# Patient Record
Sex: FEMALE | Race: WHITE | Employment: PART TIME | URBAN - NONMETROPOLITAN AREA
[De-identification: names, ages, dates, MRNs, and addresses within clinical notes are randomized per-mention and may not be internally consistent; named-entity substitution may affect disease eponyms.]

---

## 2017-01-26 LAB
CHOLESTEROL, TOTAL: 190 MG/DL
CHOLESTEROL/HDL RATIO: 2
HDLC SERPL-MCNC: 97 MG/DL (ref 35–70)
LDL CHOLESTEROL CALCULATED: 82.8 MG/DL (ref 0–160)
TRIGL SERPL-MCNC: 51 MG/DL
VLDLC SERPL CALC-MCNC: 10 MG/DL

## 2017-05-01 RX ORDER — MOMETASONE FUROATE 50 UG/1
2 SPRAY, METERED NASAL DAILY
Qty: 1 INHALER | Refills: 2 | Status: SHIPPED | OUTPATIENT
Start: 2017-05-01 | End: 2018-09-17 | Stop reason: SDUPTHER

## 2017-05-01 RX ORDER — MOMETASONE FUROATE 50 UG/1
2 SPRAY, METERED NASAL DAILY
COMMUNITY
End: 2017-05-01 | Stop reason: SDUPTHER

## 2017-05-01 RX ORDER — DOCUSATE SODIUM 100 MG/1
100 CAPSULE, LIQUID FILLED ORAL 2 TIMES DAILY
COMMUNITY
End: 2020-09-04

## 2017-05-01 RX ORDER — POLYETHYLENE GLYCOL 3350 17 G/17G
17 POWDER, FOR SOLUTION ORAL DAILY
COMMUNITY
End: 2017-09-03 | Stop reason: ALTCHOICE

## 2017-05-01 RX ORDER — OMEPRAZOLE 40 MG/1
40 CAPSULE, DELAYED RELEASE ORAL DAILY
COMMUNITY
End: 2022-08-29 | Stop reason: ALTCHOICE

## 2017-05-18 ENCOUNTER — TELEPHONE (OUTPATIENT)
Dept: FAMILY MEDICINE CLINIC | Age: 57
End: 2017-05-18

## 2017-05-22 RX ORDER — MELOXICAM 7.5 MG/1
7.5 TABLET ORAL DAILY
Qty: 30 TABLET | Refills: 1 | Status: SHIPPED | OUTPATIENT
Start: 2017-05-22 | End: 2017-07-19 | Stop reason: SDUPTHER

## 2017-06-06 ENCOUNTER — TELEPHONE (OUTPATIENT)
Dept: FAMILY MEDICINE CLINIC | Age: 57
End: 2017-06-06

## 2017-06-06 DIAGNOSIS — Z12.31 ENCOUNTER FOR SCREENING MAMMOGRAM FOR BREAST CANCER: Primary | ICD-10-CM

## 2017-06-08 DIAGNOSIS — Z12.31 ENCOUNTER FOR SCREENING MAMMOGRAM FOR BREAST CANCER: ICD-10-CM

## 2017-07-20 RX ORDER — MELOXICAM 7.5 MG/1
7.5 TABLET ORAL DAILY
Qty: 30 TABLET | Refills: 1 | Status: SHIPPED | OUTPATIENT
Start: 2017-07-20 | End: 2017-09-28 | Stop reason: SDUPTHER

## 2017-09-03 ENCOUNTER — OFFICE VISIT (OUTPATIENT)
Dept: PRIMARY CARE CLINIC | Age: 57
End: 2017-09-03
Payer: COMMERCIAL

## 2017-09-03 VITALS
OXYGEN SATURATION: 98 % | TEMPERATURE: 98.9 F | RESPIRATION RATE: 16 BRPM | HEIGHT: 64 IN | BODY MASS INDEX: 23.73 KG/M2 | DIASTOLIC BLOOD PRESSURE: 80 MMHG | SYSTOLIC BLOOD PRESSURE: 120 MMHG | WEIGHT: 139 LBS | HEART RATE: 72 BPM

## 2017-09-03 DIAGNOSIS — J06.9 UPPER RESPIRATORY TRACT INFECTION, UNSPECIFIED TYPE: Primary | ICD-10-CM

## 2017-09-03 DIAGNOSIS — J02.9 SORE THROAT: ICD-10-CM

## 2017-09-03 LAB — S PYO AG THROAT QL: NORMAL

## 2017-09-03 PROCEDURE — 87880 STREP A ASSAY W/OPTIC: CPT | Performed by: FAMILY MEDICINE

## 2017-09-03 PROCEDURE — 99213 OFFICE O/P EST LOW 20 MIN: CPT | Performed by: FAMILY MEDICINE

## 2017-09-03 RX ORDER — AMOXICILLIN AND CLAVULANATE POTASSIUM 875; 125 MG/1; MG/1
1 TABLET, FILM COATED ORAL 2 TIMES DAILY
Qty: 20 TABLET | Refills: 0 | Status: SHIPPED | OUTPATIENT
Start: 2017-09-03 | End: 2017-09-13

## 2017-09-03 RX ORDER — DEXTROMETHORPHAN HYDROBROMIDE AND PROMETHAZINE HYDROCHLORIDE 15; 6.25 MG/5ML; MG/5ML
5 SYRUP ORAL NIGHTLY PRN
Qty: 75 ML | Refills: 0 | Status: SHIPPED | OUTPATIENT
Start: 2017-09-03 | End: 2017-12-12 | Stop reason: ALTCHOICE

## 2017-09-03 ASSESSMENT — ENCOUNTER SYMPTOMS
WHEEZING: 0
RHINORRHEA: 0
COUGH: 1
SHORTNESS OF BREATH: 0
VOMITING: 0
NAUSEA: 0
DIARRHEA: 1
SINUS PRESSURE: 0
SORE THROAT: 1

## 2017-09-18 ENCOUNTER — TELEPHONE (OUTPATIENT)
Dept: FAMILY MEDICINE CLINIC | Age: 57
End: 2017-09-18

## 2017-09-18 RX ORDER — MELOXICAM 7.5 MG/1
TABLET ORAL
Qty: 30 TABLET | Refills: 1 | OUTPATIENT
Start: 2017-09-18

## 2017-09-28 RX ORDER — MELOXICAM 7.5 MG/1
7.5 TABLET ORAL DAILY
Qty: 30 TABLET | Refills: 1 | Status: SHIPPED | OUTPATIENT
Start: 2017-09-28 | End: 2017-11-19 | Stop reason: SDUPTHER

## 2017-10-03 VITALS
BODY MASS INDEX: 24.92 KG/M2 | SYSTOLIC BLOOD PRESSURE: 104 MMHG | WEIGHT: 146 LBS | HEIGHT: 64 IN | DIASTOLIC BLOOD PRESSURE: 72 MMHG | HEART RATE: 72 BPM

## 2017-10-03 DIAGNOSIS — R42 VERTIGO: ICD-10-CM

## 2017-10-03 DIAGNOSIS — L98.9 SKIN LESION: ICD-10-CM

## 2017-10-03 DIAGNOSIS — R20.0 NUMBNESS AND TINGLING IN BOTH HANDS: ICD-10-CM

## 2017-10-03 DIAGNOSIS — R20.2 NUMBNESS AND TINGLING IN BOTH HANDS: ICD-10-CM

## 2017-10-03 PROBLEM — K59.00 CONSTIPATION: Status: ACTIVE | Noted: 2017-10-03

## 2017-10-03 RX ORDER — LORATADINE 10 MG/1
10 TABLET ORAL DAILY
COMMUNITY
End: 2019-04-24 | Stop reason: ALTCHOICE

## 2017-10-03 RX ORDER — NAPROXEN SODIUM 220 MG
220 TABLET ORAL 2 TIMES DAILY WITH MEALS
COMMUNITY
End: 2018-11-14 | Stop reason: ALTCHOICE

## 2017-10-03 RX ORDER — POLYETHYLENE GLYCOL 3350 17 G/17G
17 POWDER, FOR SOLUTION ORAL DAILY
COMMUNITY
End: 2017-10-04 | Stop reason: ALTCHOICE

## 2017-10-04 ENCOUNTER — OFFICE VISIT (OUTPATIENT)
Dept: FAMILY MEDICINE CLINIC | Age: 57
End: 2017-10-04
Payer: COMMERCIAL

## 2017-10-04 VITALS
SYSTOLIC BLOOD PRESSURE: 112 MMHG | WEIGHT: 142.9 LBS | HEIGHT: 64 IN | RESPIRATION RATE: 12 BRPM | BODY MASS INDEX: 24.4 KG/M2 | HEART RATE: 76 BPM | DIASTOLIC BLOOD PRESSURE: 68 MMHG

## 2017-10-04 DIAGNOSIS — M19.039 ARTHRITIS PAIN OF WRIST: Primary | ICD-10-CM

## 2017-10-04 PROCEDURE — 99213 OFFICE O/P EST LOW 20 MIN: CPT | Performed by: NURSE PRACTITIONER

## 2017-10-04 ASSESSMENT — PATIENT HEALTH QUESTIONNAIRE - PHQ9
SUM OF ALL RESPONSES TO PHQ QUESTIONS 1-9: 0
2. FEELING DOWN, DEPRESSED OR HOPELESS: 0
1. LITTLE INTEREST OR PLEASURE IN DOING THINGS: 0
SUM OF ALL RESPONSES TO PHQ9 QUESTIONS 1 & 2: 0

## 2017-10-04 NOTE — MR AVS SNAPSHOT
After Visit Summary             Dossie Range   10/4/2017 3:15 PM   Office Visit    Description:  Female : 1960   Provider:  Ranjana Rockwell CNP   Department:  [facility]              Your Follow-Up and Future Appointments         Below is a list of your follow-up and future appointments. This may not be a complete list as you may have made appointments directly with providers that we are not aware of or your providers may have made some for you. Please call your providers to confirm appointments. It is important to keep your appointments. Please bring your current insurance card, photo ID, co-pay, and all medication bottles to your appointment. If self-pay, payment is expected at the time of service. Information from Your Visit        Department     Name Address Phone Fax    [email or web address] [facility] [phone]      Vital Signs     Blood Pressure Pulse Respirations Height Weight Body Mass Index    112/68 (Site: Left Arm, Position: Sitting, Cuff Size: Large Adult) 76 12 5' 4.17\" (1.63 m) 142 lb 14.4 oz (64.8 kg) 24.4 kg/m2    Smoking Status                   Never Smoker              Today's Medication Changes          These changes are accurate as of: 10/4/17  4:11 PM.  If you have any questions, ask your nurse or doctor.                STOP taking these medications           polyethylene glycol powder   Commonly known as:  GLYCOLAX   Stopped by:  Ranjana Rockwell CNP               Your Current Medications Are              loratadine (CLARITIN) 10 MG tablet Take 10 mg by mouth daily    naproxen sodium (ALEVE) 220 MG tablet Take 220 mg by mouth 2 times daily (with meals)    Multiple Vitamins-Minerals (BEROCCA PO) Take by mouth daily    meloxicam (MOBIC) 7.5 MG tablet Take 1 tablet by mouth daily    omeprazole (PRILOSEC) 40 MG delayed release capsule Take 40 mg by mouth daily

## 2017-10-14 NOTE — PROGRESS NOTES
[address] [address] [address]  Dept: [phone]  Dept Fax: [phone]    Chief Complaint   Patient presents with    Finger Pain     left thumb    Wrist Pain     right wrist, bump on right wrist \" shooting pain when I do certain things and some days it fine\"    Other     meloxicam improves pain     Subjective:      Barnett Nyhan is an 65 y.o. female who presents for follow up evaluation of bilateral wrist/hand pain. Onset was gradual, starting about several months ago. The pain is moderate at times. There is associated numbness, tingling, it seems worse at night with sleeping position of wrist. States she bends wrist under her chin while sleeping. There is no history of injury. Treatment to date: meloxicam.    Past Medical History:   Diagnosis Date    Allergic rhinitis      Past Surgical History:   Procedure Laterality Date    BACK SURGERY  2017    ablation     SECTION      CHOLECYSTECTOMY      COLONOSCOPY  2011    COLONOSCOPY  2014    mild diverticulosis Dr Dino Gilman  2012    TUBAL LIGATION      UPPER GASTROINTESTINAL ENDOSCOPY  2011     No family history on file.   Social History     Social History    Marital status:      Spouse name: N/A    Number of children: N/A    Years of education: N/A     Social History Main Topics    Smoking status: Never Smoker    Smokeless tobacco: Never Used    Alcohol use No    Drug use: No    Sexual activity: Not on file     Other Topics Concern    Not on file     Social History Narrative    No narrative on file     Current Outpatient Prescriptions   Medication Sig Dispense Refill    loratadine (CLARITIN) 10 MG tablet Take 10 mg by mouth daily      naproxen sodium (ALEVE) 220 MG tablet Take 220 mg by mouth 2 times daily (with meals)      Multiple Vitamins-Minerals (BEROCCA PO) Take by mouth daily      meloxicam (MOBIC) 7.5 MG tablet Take 1 tablet by mouth daily 30 tablet 1    omeprazole (PRILOSEC) 40 MG delayed release capsule Take 40 mg by mouth daily      docusate sodium (COLACE) 100 MG capsule Take 100 mg by mouth 2 times daily      Probiotic Product (PROBIOTIC DAILY PO) Take 1 tablet by mouth      hyoscyamine (HYOMAX-SL) 125 MCG sublingual tablet Place 125 mcg under the tongue every 4 hours as needed for Cramping      mometasone (NASONEX) 50 MCG/ACT nasal spray 2 sprays by Nasal route daily 1 Inhaler 2    diclofenac 1.5 % SOLN   0    promethazine-dextromethorphan (PROMETHAZINE-DM) 6.25-15 MG/5ML syrup Take 5 mLs by mouth nightly as needed for Cough 75 mL 0     No current facility-administered medications for this visit. Allergies   Allergen Reactions    Sulfa Antibiotics      Cream gives a rash     Review of Systems  Constitutional: negative for chills, fatigue and fevers  Respiratory: negative for cough, shortness of breath and wheezing  Cardiovascular: negative for chest pain, lower extremity edema and palpitations  Musculoskeletal:positive for bilateral wrist/hand pain, negative for muscle weakness  Neurological: negative for dizziness, headaches and weakness     Objective:      /68 (Site: Left Arm, Position: Sitting, Cuff Size: Large Adult)   Pulse 76   Resp 12   Ht 5' 4.17\" (1.63 m)   Wt 142 lb 14.4 oz (64.8 kg)   BMI 24.40 kg/m²      Physical Exam   Constitutional: She is well-developed, well-nourished, and in no distress. HENT:   Head: Normocephalic. Cardiovascular: Normal rate, regular rhythm and normal heart sounds. Pulmonary/Chest: Effort normal and breath sounds normal. No respiratory distress. She has no wheezes. Neurological: She is alert.      Right wrist:  normal exam, no swelling, tenderness, instability; ligaments intact, full ROM both hands, wrists, and finger joints   Left wrist:  normal exam, no swelling, tenderness, instability; ligaments intact, full ROM both hands,

## 2017-11-20 NOTE — TELEPHONE ENCOUNTER
[facility] is calling to request a refill on the following medication(s):  Requested Prescriptions     Pending Prescriptions Disp Refills    meloxicam (MOBIC) 7.5 MG tablet [Pharmacy Med Name: MELOXICAM 7.5 MG TABLET] 30 tablet 1     Sig: take 1 tablet by mouth once daily       Last Visit Date (If Applicable):  99/1/9753    Next Visit Date:    Visit date not found

## 2017-11-21 RX ORDER — MELOXICAM 7.5 MG/1
TABLET ORAL
Qty: 30 TABLET | Refills: 1 | Status: SHIPPED | OUTPATIENT
Start: 2017-11-21 | End: 2017-12-12 | Stop reason: SDUPTHER

## 2017-12-12 ENCOUNTER — OFFICE VISIT (OUTPATIENT)
Dept: FAMILY MEDICINE CLINIC | Age: 57
End: 2017-12-12
Payer: COMMERCIAL

## 2017-12-12 VITALS
SYSTOLIC BLOOD PRESSURE: 120 MMHG | BODY MASS INDEX: 24.07 KG/M2 | OXYGEN SATURATION: 98 % | HEART RATE: 70 BPM | RESPIRATION RATE: 10 BRPM | DIASTOLIC BLOOD PRESSURE: 76 MMHG | WEIGHT: 141 LBS

## 2017-12-12 DIAGNOSIS — S46.912A SHOULDER STRAIN, LEFT, INITIAL ENCOUNTER: Primary | ICD-10-CM

## 2017-12-12 PROCEDURE — 99213 OFFICE O/P EST LOW 20 MIN: CPT | Performed by: NURSE PRACTITIONER

## 2017-12-12 RX ORDER — MELOXICAM 15 MG/1
TABLET ORAL
Qty: 30 TABLET | Refills: 1 | Status: SHIPPED | OUTPATIENT
Start: 2017-12-12 | End: 2018-02-12 | Stop reason: SDUPTHER

## 2017-12-12 NOTE — PROGRESS NOTES
[address] [address] [address]  Dept: [phone]  Dept Fax: [phone]      Sam Ryan is a 65 y.o. female who presents today for her medical conditions/complaints as noted below. Chief Complaint   Patient presents with    Arm Pain     left arm pain has been  going on for the past few weeks. Patient taking aleve and taking mobic \" maybe arthritis. Does not bother me at night\" patient points to bicep area       HPI:     Arm Pain    The incident occurred more than 1 week ago. There was no injury mechanism. The pain is present in the left shoulder (and upper arm). Quality: throbbing. The pain does not radiate. The pain is at a severity of 4/10. The pain is mild. The pain has been intermittent since the incident. Pertinent negatives include no chest pain, numbness or tingling. She has tried ice, NSAIDs and rest for the symptoms. The treatment provided moderate relief. Current Outpatient Prescriptions   Medication Sig Dispense Refill    meloxicam (MOBIC) 15 MG tablet take 1 tablet by mouth once daily 30 tablet 1    loratadine (CLARITIN) 10 MG tablet Take 10 mg by mouth daily      naproxen sodium (ALEVE) 220 MG tablet Take 220 mg by mouth 2 times daily (with meals)      Multiple Vitamins-Minerals (BEROCCA PO) Take by mouth daily      omeprazole (PRILOSEC) 40 MG delayed release capsule Take 40 mg by mouth daily      docusate sodium (COLACE) 100 MG capsule Take 100 mg by mouth 2 times daily      Probiotic Product (PROBIOTIC DAILY PO) Take 1 tablet by mouth      hyoscyamine (HYOMAX-SL) 125 MCG sublingual tablet Place 125 mcg under the tongue every 4 hours as needed for Cramping      mometasone (NASONEX) 50 MCG/ACT nasal spray 2 sprays by Nasal route daily 1 Inhaler 2    diclofenac 1.5 % SOLN   0     No current facility-administered medications for this visit.       Allergies   Allergen Reactions    Sulfa Antibiotics      Cream gives a rash       Past Medical History:   Diagnosis Date    Allergic rhinitis      Past Surgical History:   Procedure Laterality Date    BACK SURGERY  2017    ablation     SECTION      CHOLECYSTECTOMY      COLONOSCOPY  2011    COLONOSCOPY  2014    mild diverticulosis Dr Madhu Rodrigues  2012    TUBAL LIGATION      UPPER GASTROINTESTINAL ENDOSCOPY  2011     Social History     Social History    Marital status:      Spouse name: N/A    Number of children: N/A    Years of education: N/A     Occupational History    Not on file. Social History Main Topics    Smoking status: Never Smoker    Smokeless tobacco: Never Used    Alcohol use No    Drug use: No    Sexual activity: Not on file     Other Topics Concern    Not on file     Social History Narrative    No narrative on file     No family history on file. Subjective:      Review of Systems   Constitutional: Negative for chills, fatigue and fever. Respiratory: Negative for shortness of breath and wheezing. Cardiovascular: Negative for chest pain. Musculoskeletal:        Intermittent left shoulder/arm pain   Neurological: Negative for dizziness, tingling, weakness, numbness and headaches. Objective:     /76 (Site: Right Arm, Position: Sitting, Cuff Size: Large Adult)   Pulse 70   Resp 10   Wt 141 lb (64 kg)   SpO2 98%   BMI 24.07 kg/m²     Physical Exam   Constitutional: She is oriented to person, place, and time. She appears well-developed and well-nourished. HENT:   Head: Normocephalic. Neck: Neck supple. Cardiovascular: Normal rate, regular rhythm and normal heart sounds. Pulmonary/Chest: Effort normal and breath sounds normal. No respiratory distress. She has no wheezes. Musculoskeletal:        Right shoulder: Normal.        Left shoulder: She exhibits normal range of motion, no tenderness, no spasm and normal strength.    Neurological: She is alert and oriented to person, place, and time. She has normal strength. Assessment:     1. Shoulder strain, left, initial encounter  meloxicam (MOBIC) 15 MG tablet    CANCELED: XR SHOULDER LEFT (MIN 2 VIEWS)     Plan:     Return if symptoms worsen or fail to improve. Orders Placed This Encounter   Procedures    XR SHOULDER LEFT (MIN 2 VIEWS)     Orders Placed This Encounter   Medications    meloxicam (MOBIC) 15 MG tablet     Sig: take 1 tablet by mouth once daily     Dispense:  30 tablet     Refill:  1     Discussed use, benefit, and side effects of prescribed medications. Increased meloxicam to 15 mg. Instructed NOT to take any other NSAIDS with meloxicam.  All patient questions answered. Patient voiced understanding. Follow up as directed. May need physical therapy if not improving.      Electronically signed by Oj Keith CNP on 12/18/2017 at 1:19 PM

## 2017-12-18 ASSESSMENT — ENCOUNTER SYMPTOMS
SHORTNESS OF BREATH: 0
WHEEZING: 0

## 2018-02-12 DIAGNOSIS — S46.912A SHOULDER STRAIN, LEFT, INITIAL ENCOUNTER: ICD-10-CM

## 2018-02-13 RX ORDER — MELOXICAM 15 MG/1
TABLET ORAL
Qty: 30 TABLET | Refills: 1 | Status: SHIPPED | OUTPATIENT
Start: 2018-02-13 | End: 2018-04-17 | Stop reason: SDUPTHER

## 2018-04-17 DIAGNOSIS — S46.912A SHOULDER STRAIN, LEFT, INITIAL ENCOUNTER: ICD-10-CM

## 2018-04-18 RX ORDER — MELOXICAM 15 MG/1
TABLET ORAL
Qty: 30 TABLET | Refills: 5 | Status: SHIPPED | OUTPATIENT
Start: 2018-04-18 | End: 2018-10-22 | Stop reason: SDUPTHER

## 2018-07-20 RX ORDER — HYOSCYAMINE SULFATE 0.125 MG
TABLET,DISINTEGRATING ORAL
Qty: 30 TABLET | Refills: 5 | Status: SHIPPED | OUTPATIENT
Start: 2018-07-20 | End: 2020-06-18 | Stop reason: SDUPTHER

## 2018-07-20 NOTE — TELEPHONE ENCOUNTER
Chinyere Paniagua is calling to request a refill on the following medication(s):  Requested Prescriptions     Pending Prescriptions Disp Refills    hyoscyamine (OSCIMIN) 125 MCG TBDP dispersible tablet [Pharmacy Med Name: HYOSCYAMINE 0.125 MG ODT] 30 tablet 5     Sig: take 1 tablet under the tongue every 4 hours if needed       Last Visit Date (If Applicable):  Visit date not found    Next Visit Date:    Visit date not found

## 2018-08-01 ENCOUNTER — TELEPHONE (OUTPATIENT)
Dept: FAMILY MEDICINE CLINIC | Age: 58
End: 2018-08-01

## 2018-08-02 DIAGNOSIS — Z12.31 SCREENING MAMMOGRAM, ENCOUNTER FOR: Primary | ICD-10-CM

## 2018-09-17 RX ORDER — MOMETASONE FUROATE 50 UG/1
SPRAY, METERED NASAL
Qty: 17 G | Refills: 2 | Status: SHIPPED | OUTPATIENT
Start: 2018-09-17 | End: 2020-09-04

## 2018-10-22 DIAGNOSIS — S46.912A SHOULDER STRAIN, LEFT, INITIAL ENCOUNTER: ICD-10-CM

## 2018-10-22 RX ORDER — MELOXICAM 15 MG/1
TABLET ORAL
Qty: 30 TABLET | Refills: 5 | Status: SHIPPED | OUTPATIENT
Start: 2018-10-22 | End: 2019-04-22 | Stop reason: SDUPTHER

## 2018-11-14 ENCOUNTER — OFFICE VISIT (OUTPATIENT)
Dept: FAMILY MEDICINE CLINIC | Age: 58
End: 2018-11-14
Payer: COMMERCIAL

## 2018-11-14 VITALS
DIASTOLIC BLOOD PRESSURE: 80 MMHG | WEIGHT: 146 LBS | BODY MASS INDEX: 24.93 KG/M2 | SYSTOLIC BLOOD PRESSURE: 110 MMHG | HEART RATE: 72 BPM

## 2018-11-14 DIAGNOSIS — M19.031 ARTHRITIS OF BOTH WRISTS: Primary | ICD-10-CM

## 2018-11-14 DIAGNOSIS — M19.032 ARTHRITIS OF BOTH WRISTS: Primary | ICD-10-CM

## 2018-11-14 PROCEDURE — 99213 OFFICE O/P EST LOW 20 MIN: CPT | Performed by: NURSE PRACTITIONER

## 2018-11-14 ASSESSMENT — PATIENT HEALTH QUESTIONNAIRE - PHQ9
1. LITTLE INTEREST OR PLEASURE IN DOING THINGS: 0
2. FEELING DOWN, DEPRESSED OR HOPELESS: 0
SUM OF ALL RESPONSES TO PHQ QUESTIONS 1-9: 0
SUM OF ALL RESPONSES TO PHQ QUESTIONS 1-9: 0
SUM OF ALL RESPONSES TO PHQ9 QUESTIONS 1 & 2: 0

## 2018-11-14 NOTE — PROGRESS NOTES
Cardiovascular: Normal rate, regular rhythm and normal heart sounds. Pulmonary/Chest: Effort normal and breath sounds normal. No respiratory distress. Musculoskeletal:        Right wrist: She exhibits normal range of motion, no tenderness and no swelling. Left wrist: She exhibits tenderness (left thumb/wrist- radial side) and swelling (left thumb ). She exhibits normal range of motion. Lymphadenopathy:     She has no cervical adenopathy. Neurological: She is alert and oriented to person, place, and time. Assessment:     1. Arthritis of both wrists        Plan:     Orders Placed This Encounter   Procedures   [facility]ational [facility] - [place]     Referral Priority:   Routine     Referral Type:   Eval and Treat     Referral Reason:   Specialty Services Required     Referral Location:   [facility]     Requested Specialty:   Occupational Therapy     Number of Visits Requested:   1       Return if symptoms worsen or fail to improve. Instructed to continue taking Meloxicam with a meal, and Tylenol arthritis as needed for pain. Discussed use, benefit, and side effects of prescribed medications. All patient questions answered. Patient voiced understanding. Health Maintenance reviewed. Instructed to continue current medications, diet and exercise. Patient agreed with treatment plan. Follow up as directed.      Electronically signed by GONZALEZ Kc CNP on 11/24/2018

## 2019-01-12 ENCOUNTER — HOSPITAL ENCOUNTER (OUTPATIENT)
Dept: GENERAL RADIOLOGY | Age: 59
Discharge: HOME | End: 2019-01-14
Payer: COMMERCIAL

## 2019-01-12 ENCOUNTER — OFFICE VISIT (OUTPATIENT)
Dept: PRIMARY CARE CLINIC | Age: 59
End: 2019-01-12
Payer: COMMERCIAL

## 2019-01-12 VITALS
WEIGHT: 147 LBS | HEIGHT: 64 IN | DIASTOLIC BLOOD PRESSURE: 82 MMHG | BODY MASS INDEX: 25.1 KG/M2 | SYSTOLIC BLOOD PRESSURE: 128 MMHG | OXYGEN SATURATION: 97 % | HEART RATE: 84 BPM

## 2019-01-12 DIAGNOSIS — S60.011A CONTUSION OF RIGHT THUMB WITHOUT DAMAGE TO NAIL, INITIAL ENCOUNTER: Primary | ICD-10-CM

## 2019-01-12 DIAGNOSIS — M79.644 PAIN OF RIGHT THUMB: ICD-10-CM

## 2019-01-12 PROCEDURE — 99213 OFFICE O/P EST LOW 20 MIN: CPT | Performed by: FAMILY MEDICINE

## 2019-01-12 PROCEDURE — 73130 X-RAY EXAM OF HAND: CPT

## 2019-04-13 ENCOUNTER — OFFICE VISIT (OUTPATIENT)
Dept: FAMILY MEDICINE CLINIC | Age: 59
End: 2019-04-13
Payer: COMMERCIAL

## 2019-04-13 VITALS
DIASTOLIC BLOOD PRESSURE: 76 MMHG | HEART RATE: 64 BPM | WEIGHT: 142 LBS | SYSTOLIC BLOOD PRESSURE: 110 MMHG | BODY MASS INDEX: 24.37 KG/M2 | TEMPERATURE: 98.1 F

## 2019-04-13 DIAGNOSIS — H65.03 BILATERAL ACUTE SEROUS OTITIS MEDIA, RECURRENCE NOT SPECIFIED: Primary | ICD-10-CM

## 2019-04-13 PROCEDURE — 99213 OFFICE O/P EST LOW 20 MIN: CPT | Performed by: NURSE PRACTITIONER

## 2019-04-13 RX ORDER — PREDNISONE 20 MG/1
40 TABLET ORAL DAILY
Qty: 10 TABLET | Refills: 0 | Status: SHIPPED | OUTPATIENT
Start: 2019-04-13 | End: 2019-04-24 | Stop reason: ALTCHOICE

## 2019-04-13 RX ORDER — FLUTICASONE PROPIONATE 50 MCG
2 SPRAY, SUSPENSION (ML) NASAL DAILY
Qty: 1 BOTTLE | Refills: 0 | Status: SHIPPED | OUTPATIENT
Start: 2019-04-13 | End: 2019-09-17 | Stop reason: SDUPTHER

## 2019-04-13 RX ORDER — PREDNISOLONE ACETATE 10 MG/ML
SUSPENSION/ DROPS OPHTHALMIC
Refills: 0 | COMMUNITY
Start: 2019-04-05 | End: 2019-04-24 | Stop reason: ALTCHOICE

## 2019-04-13 ASSESSMENT — ENCOUNTER SYMPTOMS
COUGH: 0
SORE THROAT: 1
RHINORRHEA: 0

## 2019-04-13 NOTE — PROGRESS NOTES
[address] [address] [address]  [facility], APRN-CNP  [address]  Phone:  [phone]  Fax:  [phone]  Tammy Escobar is a 63 y.o. female who presents today for her medical conditions/complaints as noted below. Tammy Escobar c/o of Otalgia (pt c/o ear pain, bilateral, some throbbing, has been having some sinus issues and has used a phillip pot, says when used phillip pot could feel fluid in ears, has taken ibuprofen for pain)      HPI:     Otalgia    There is pain in both ears. This is a new problem. The current episode started in the past 7 days. The problem has been gradually worsening. Associated symptoms include headaches, hearing loss and a sore throat. Pertinent negatives include no coughing, ear discharge or rhinorrhea. Associated symptoms comments: Sinus pressure, clear drainage for the most part. Treatments tried: sudafed and neti pot, nasonex, afrin. The treatment provided no relief. There is no history of a chronic ear infection. Wt Readings from Last 3 Encounters:   19 142 lb (64.4 kg)   19 147 lb (66.7 kg)   18 146 lb (66.2 kg)       Temp Readings from Last 3 Encounters:   19 98.1 °F (36.7 °C)   17 98.9 °F (37.2 °C)       BP Readings from Last 3 Encounters:   19 110/76   19 128/82   18 110/80       Pulse Readings from Last 3 Encounters:   19 64   19 84   18 72              Past Medical History:   Diagnosis Date    Allergic rhinitis       Past Surgical History:   Procedure Laterality Date    BACK SURGERY  2017    ablation     SECTION      CHOLECYSTECTOMY      COLONOSCOPY  2011    COLONOSCOPY  2014    mild diverticulosis Dr Abhinav Escalona  2012    TUBAL LIGATION      UPPER GASTROINTESTINAL ENDOSCOPY  2011     History reviewed. No pertinent family history.   Social History     Tobacco Use    Smoking status: Never Smoker  Smokeless tobacco: Never Used   Substance Use Topics    Alcohol use: No      Current Outpatient Medications   Medication Sig Dispense Refill    prednisoLONE acetate (PRED FORTE) 1 % ophthalmic suspension INSTILL 1 DROP IN LEFT EYE 4 TIMES A DAY FOR 1 WEEK  0    predniSONE (DELTASONE) 20 MG tablet Take 2 tablets by mouth daily 10 tablet 0    fluticasone (FLONASE) 50 MCG/ACT nasal spray 2 sprays by Each Nare route daily 1 Bottle 0    meloxicam (MOBIC) 15 MG tablet take 1 tablet by mouth once daily with food or milk 30 tablet 5    mometasone (NASONEX) 50 MCG/ACT nasal spray instill 2 sprays into each nostril once daily 17 g 2    hyoscyamine (OSCIMIN) 125 MCG TBDP dispersible tablet take 1 tablet under the tongue every 4 hours if needed 30 tablet 5    diclofenac 1.5 % SOLN   0    loratadine (CLARITIN) 10 MG tablet Take 10 mg by mouth daily      Multiple Vitamins-Minerals (BEROCCA PO) Take by mouth daily      omeprazole (PRILOSEC) 40 MG delayed release capsule Take 40 mg by mouth daily      docusate sodium (COLACE) 100 MG capsule Take 100 mg by mouth 2 times daily      Probiotic Product (PROBIOTIC DAILY PO) Take 1 tablet by mouth      hyoscyamine (HYOMAX-SL) 125 MCG sublingual tablet Place 125 mcg under the tongue every 4 hours as needed for Cramping       No current facility-administered medications for this visit. Allergies   Allergen Reactions    Sulfa Antibiotics      Cream gives a rash       No exam data present    Subjective:      Review of Systems   HENT: Positive for ear pain, hearing loss and sore throat. Negative for ear discharge and rhinorrhea. Respiratory: Negative for cough. Neurological: Positive for headaches. Objective:     /76 (Site: Right Upper Arm, Position: Sitting)   Pulse 64   Temp 98.1 °F (36.7 °C)   Wt 142 lb (64.4 kg)   BMI 24.37 kg/m²     Physical Exam   Constitutional: She is oriented to person, place, and time.  Vital signs are normal. She appears well-developed and well-nourished. No distress. HENT:   Head: Normocephalic. Right Ear: External ear normal. A middle ear effusion is present. Left Ear: External ear normal. A middle ear effusion is present. Nose: Right sinus exhibits no maxillary sinus tenderness and no frontal sinus tenderness. Left sinus exhibits no maxillary sinus tenderness and no frontal sinus tenderness. Mouth/Throat: Uvula is midline, oropharynx is clear and moist and mucous membranes are normal.   Eyes: Conjunctivae and EOM are normal. Right eye exhibits no discharge. Left eye exhibits no discharge. No scleral icterus. Neck: Normal range of motion. Neck supple. Cardiovascular: Normal rate and regular rhythm. Pulmonary/Chest: Effort normal. No respiratory distress. Musculoskeletal: Normal range of motion. Neurological: She is alert and oriented to person, place, and time. Skin: Skin is warm, dry and intact. Capillary refill takes less than 2 seconds. She is not diaphoretic. Psychiatric: She has a normal mood and affect. Her speech is normal and behavior is normal. Judgment and thought content normal. Cognition and memory are normal.   Nursing note and vitals reviewed. Assessment:      Diagnosis Orders   1. Bilateral acute serous otitis media, recurrence not specified  predniSONE (DELTASONE) 20 MG tablet    fluticasone (FLONASE) 50 MCG/ACT nasal spray     No results found for this visit on 04/13/19. No infection noted. Plan:       Take the prednisone with food, preferably first thing in the morning. Flonase nasal spray daily in the AM.  Sudafed 12 hour. 1 tab each morning. Use distilled water for your neti-pot. Follow up with primary care provider in 1 to 2 days. Patient Instructions     Take the prednisone with food, preferably first thing in the morning. Flonase nasal spray daily in the AM.  Sudafed 12 hour. 1 tab each morning. Use distilled water for your neti-pot.   Follow up with primary care provider in 1 to 2 days. Patient Education        Middle Ear Fluid: Care Instructions  Your Care Instructions    Fluid often builds up inside the ear during a cold or allergies. Usually the fluid drains away, but sometimes a small tube in the ear, called the eustachian tube, stays blocked for months. Symptoms of fluid buildup may include:  · Popping, ringing, or a feeling of fullness or pressure in the ear. · Trouble hearing. · Balance problems and dizziness. In most cases, you can treat yourself at home. Follow-up care is a key part of your treatment and safety. Be sure to make and go to all appointments, and call your doctor if you are having problems. It's also a good idea to know your test results and keep a list of the medicines you take. How can you care for yourself at home? · In most cases, the fluid clears up within a few months without treatment. You may need more tests if the fluid does not clear up after 3 months. · If your doctor prescribed antibiotics, take them as directed. Do not stop taking them just because you feel better. You need to take the full course of antibiotics. When should you call for help? Call your doctor now or seek immediate medical care if:    · You have symptoms of infection, such as:  ? Increased pain, swelling, warmth, or redness. ? Pus draining from the area. ? A fever.    Watch closely for changes in your health, and be sure to contact your doctor if:    · You notice changes in hearing.     · You do not get better as expected. Where can you learn more? Go to https://[employer] [email or web address] org and sign in to your Clavister account. Enter W479 in the Socialbakers box to learn more about \"Middle Ear Fluid: Care Instructions. \"     If you do not have an account, please click on the \"Sign Up Now\" link. Current as of: March 27, 2018  Content Version: 11.9  © 3171-0773 [employer].  Care instructions adapted under license by [facility] ([facility]). If you have questions about a medical condition or this instruction, always ask your healthcare professional. [place] [address] any warranty or liability for your use of this information. Patient/Caregiver instructed on use, benefit, and side effects of prescribed medications. All patient/parent/caregiver questions answered. Patient/parent/caregiver voiced understanding. Reviewed health maintenance. Instructed to continue current medications, diet and exercise. Patient agreed with treatment plan. Follow up as directed.            Electronically signed by GONZALEZ Monahan CNP on4/13/2019

## 2019-04-13 NOTE — PATIENT INSTRUCTIONS
Take the prednisone with food, preferably first thing in the morning. Flonase nasal spray daily in the AM.  Sudafed 12 hour. 1 tab each morning. Use distilled water for your neti-pot. Follow up with primary care provider in 1 to 2 days. Patient Education        Middle Ear Fluid: Care Instructions  Your Care Instructions    Fluid often builds up inside the ear during a cold or allergies. Usually the fluid drains away, but sometimes a small tube in the ear, called the eustachian tube, stays blocked for months. Symptoms of fluid buildup may include:  · Popping, ringing, or a feeling of fullness or pressure in the ear. · Trouble hearing. · Balance problems and dizziness. In most cases, you can treat yourself at home. Follow-up care is a key part of your treatment and safety. Be sure to make and go to all appointments, and call your doctor if you are having problems. It's also a good idea to know your test results and keep a list of the medicines you take. How can you care for yourself at home? · In most cases, the fluid clears up within a few months without treatment. You may need more tests if the fluid does not clear up after 3 months. · If your doctor prescribed antibiotics, take them as directed. Do not stop taking them just because you feel better. You need to take the full course of antibiotics. When should you call for help? Call your doctor now or seek immediate medical care if:    · You have symptoms of infection, such as:  ? Increased pain, swelling, warmth, or redness. ? Pus draining from the area. ? A fever.    Watch closely for changes in your health, and be sure to contact your doctor if:    · You notice changes in hearing.     · You do not get better as expected. Where can you learn more? Go to https://MetraTechforeigneb.MyDemocracy. org and sign in to your HackHands account. Enter D638 in the Tapit box to learn more about \"Middle Ear Fluid: Care Instructions. \"     If

## 2019-04-22 DIAGNOSIS — S46.912A SHOULDER STRAIN, LEFT, INITIAL ENCOUNTER: ICD-10-CM

## 2019-04-22 NOTE — TELEPHONE ENCOUNTER
Darrne [facility] is calling to request a refill on the following medication(s):  Requested Prescriptions     Pending Prescriptions Disp Refills    meloxicam (MOBIC) 15 MG tablet [Pharmacy Med Name: MELOXICAM 15 MG TABLET] 30 tablet 5     Sig: take 1 tablet by mouth once daily with food or milk       Last Visit Date (If Applicable):  40/91/2576    Next Visit Date:    Not found

## 2019-04-23 RX ORDER — MELOXICAM 15 MG/1
TABLET ORAL
Qty: 30 TABLET | Refills: 5 | Status: SHIPPED | OUTPATIENT
Start: 2019-04-23 | End: 2019-10-15 | Stop reason: SDUPTHER

## 2019-04-24 ENCOUNTER — OFFICE VISIT (OUTPATIENT)
Dept: FAMILY MEDICINE CLINIC | Age: 59
End: 2019-04-24
Payer: COMMERCIAL

## 2019-04-24 VITALS
BODY MASS INDEX: 24.03 KG/M2 | HEART RATE: 65 BPM | WEIGHT: 140 LBS | OXYGEN SATURATION: 98 % | DIASTOLIC BLOOD PRESSURE: 58 MMHG | SYSTOLIC BLOOD PRESSURE: 100 MMHG

## 2019-04-24 DIAGNOSIS — H65.91 MEE (MIDDLE EAR EFFUSION), RIGHT: Primary | ICD-10-CM

## 2019-04-24 PROCEDURE — 99213 OFFICE O/P EST LOW 20 MIN: CPT | Performed by: NURSE PRACTITIONER

## 2019-04-24 RX ORDER — CETIRIZINE HYDROCHLORIDE 10 MG/1
10 TABLET ORAL DAILY
Qty: 30 TABLET | Refills: 5 | COMMUNITY
Start: 2019-04-24

## 2019-04-24 ASSESSMENT — PATIENT HEALTH QUESTIONNAIRE - PHQ9
SUM OF ALL RESPONSES TO PHQ9 QUESTIONS 1 & 2: 0
SUM OF ALL RESPONSES TO PHQ QUESTIONS 1-9: 0
SUM OF ALL RESPONSES TO PHQ QUESTIONS 1-9: 0
1. LITTLE INTEREST OR PLEASURE IN DOING THINGS: 0
2. FEELING DOWN, DEPRESSED OR HOPELESS: 0

## 2019-04-24 ASSESSMENT — ENCOUNTER SYMPTOMS
SORE THROAT: 0
COUGH: 0
WHEEZING: 0
RHINORRHEA: 0
SHORTNESS OF BREATH: 0
SINUS PRESSURE: 0

## 2019-04-24 NOTE — PROGRESS NOTES
Diagnosis Date    Allergic rhinitis      Past Surgical History:   Procedure Laterality Date    BACK SURGERY  2017    ablation     SECTION      CHOLECYSTECTOMY      COLONOSCOPY  2011    COLONOSCOPY  2014    mild diverticulosis Dr Melisa Jorgensen  2012    TUBAL LIGATION      UPPER GASTROINTESTINAL ENDOSCOPY  2011     Social History     Socioeconomic History    Marital status:      Spouse name: Not on file    Number of children: Not on file    Years of education: Not on file    Highest education level: Not on file   Occupational History    Not on file   Social Needs    Financial resource strain: Not on file    Food insecurity:     Worry: Not on file     Inability: Not on file    Transportation needs:     Medical: Not on file     Non-medical: Not on file   Tobacco Use    Smoking status: Never Smoker    Smokeless tobacco: Never Used   Substance and Sexual Activity    Alcohol use: No    Drug use: No    Sexual activity: Not on file   Lifestyle    Physical activity:     Days per week: Not on file     Minutes per session: Not on file    Stress: Not on file   Relationships    Social connections:     Talks on phone: Not on file     Gets together: Not on file     Attends Synagogue service: Not on file     Active member of club or organization: Not on file     Attends meetings of clubs or organizations: Not on file     Relationship status: Not on file    Intimate partner violence:     Fear of current or ex partner: Not on file     Emotionally abused: Not on file     Physically abused: Not on file     Forced sexual activity: Not on file   Other Topics Concern    Not on file   Social History Narrative    Not on file     History reviewed. No pertinent family history. Subjective:      Review of Systems   Constitutional: Negative for appetite change, chills, fatigue and fever.    HENT: Positive for ear pain (left feels plugged, right is painful). Negative for congestion, ear discharge, postnasal drip, rhinorrhea, sinus pressure and sore throat. Respiratory: Negative for cough, shortness of breath and wheezing. Cardiovascular: Negative for chest pain. Musculoskeletal: Negative for neck pain. Neurological: Negative for headaches. Objective:     BP (!) 100/58 (Site: Left Upper Arm, Position: Sitting)   Pulse 65   Wt 140 lb (63.5 kg)   SpO2 98%   BMI 24.03 kg/m²     Physical Exam   Constitutional: She is oriented to person, place, and time. She appears well-developed and well-nourished. HENT:   Head: Normocephalic. Right Ear: Tympanic membrane is scarred. A middle ear effusion is present. Left Ear: Tympanic membrane and ear canal normal.   Nose: Nose normal.   Mouth/Throat: Oropharynx is clear and moist.   Eyes: Conjunctivae are normal.   Neck: Neck supple. Cardiovascular: Normal rate, regular rhythm and normal heart sounds. Pulmonary/Chest: Effort normal and breath sounds normal. No respiratory distress. She has no wheezes. Lymphadenopathy:     She has no cervical adenopathy. Neurological: She is alert and oriented to person, place, and time. Assessment:      Diagnosis Orders   1. KEVIN (middle ear effusion), right  cetirizine (ZYRTEC ALLERGY) 10 MG tablet       Plan:     Return if symptoms worsen or fail to improve. Orders Placed This Encounter   Medications    cetirizine (ZYRTEC ALLERGY) 10 MG tablet     Sig: Take 1 tablet by mouth daily     Dispense:  30 tablet     Refill:  5      Instructed to start Flonase, discontinue Claritin and start Zyrtec. Discussed use, benefit, and side effects of prescribed medications. Continue tylenol/ibuprofen as needed for fevers/discomfort. Monitor for worsening symptoms, call office with any concerns. All patient questions answered. Patient voiced understanding. Follow up as directed.      Electronically signed by GONZALEZ Fitzgerald CNP on 4/24/2019 at 2:49

## 2019-07-31 DIAGNOSIS — Z12.31 SCREENING MAMMOGRAM, ENCOUNTER FOR: Primary | ICD-10-CM

## 2019-09-16 DIAGNOSIS — H65.03 BILATERAL ACUTE SEROUS OTITIS MEDIA, RECURRENCE NOT SPECIFIED: ICD-10-CM

## 2019-09-17 RX ORDER — FLUTICASONE PROPIONATE 50 MCG
SPRAY, SUSPENSION (ML) NASAL
Qty: 16 G | Refills: 0 | Status: SHIPPED | OUTPATIENT
Start: 2019-09-17 | End: 2020-07-20

## 2019-10-03 ENCOUNTER — OFFICE VISIT (OUTPATIENT)
Dept: FAMILY MEDICINE CLINIC | Age: 59
End: 2019-10-03
Payer: COMMERCIAL

## 2019-10-03 ENCOUNTER — HOSPITAL ENCOUNTER (OUTPATIENT)
Age: 59
Setting detail: SPECIMEN
Discharge: HOME | End: 2019-10-03
Payer: COMMERCIAL

## 2019-10-03 VITALS
DIASTOLIC BLOOD PRESSURE: 68 MMHG | HEART RATE: 70 BPM | BODY MASS INDEX: 23.52 KG/M2 | SYSTOLIC BLOOD PRESSURE: 110 MMHG | OXYGEN SATURATION: 97 % | WEIGHT: 137 LBS

## 2019-10-03 DIAGNOSIS — N30.00 ACUTE CYSTITIS WITHOUT HEMATURIA: ICD-10-CM

## 2019-10-03 DIAGNOSIS — N89.8 VAGINAL ITCHING: Primary | ICD-10-CM

## 2019-10-03 LAB
APPEARANCE FLUID: CLEAR
BILIRUBIN, POC: NEGATIVE
BLOOD URINE, POC: NEGATIVE
CLARITY, POC: CLEAR
COLOR, POC: ABNORMAL
GLUCOSE URINE, POC: NEGATIVE
KETONES, POC: NEGATIVE
LEUKOCYTE EST, POC: ABNORMAL
NITRITE, POC: NEGATIVE
PH, POC: 7
PROTEIN, POC: NEGATIVE
SPECIFIC GRAVITY, POC: 1
UROBILINOGEN, POC: 0.2

## 2019-10-03 PROCEDURE — 87086 URINE CULTURE/COLONY COUNT: CPT

## 2019-10-03 PROCEDURE — 99213 OFFICE O/P EST LOW 20 MIN: CPT | Performed by: NURSE PRACTITIONER

## 2019-10-03 PROCEDURE — 87086 URINE CULTURE/COLONY COUNT: CPT | Performed by: NURSE PRACTITIONER

## 2019-10-03 PROCEDURE — 81002 URINALYSIS NONAUTO W/O SCOPE: CPT | Performed by: NURSE PRACTITIONER

## 2019-10-03 RX ORDER — CIPROFLOXACIN 500 MG/1
500 TABLET, FILM COATED ORAL 2 TIMES DAILY
Qty: 14 TABLET | Refills: 0 | Status: SHIPPED | OUTPATIENT
Start: 2019-10-03 | End: 2019-10-10

## 2019-10-03 RX ORDER — FLUCONAZOLE 150 MG/1
TABLET ORAL
Qty: 2 TABLET | Refills: 0 | Status: SHIPPED | OUTPATIENT
Start: 2019-10-03 | End: 2019-10-06

## 2019-10-03 ASSESSMENT — ENCOUNTER SYMPTOMS
NAUSEA: 0
ABDOMINAL PAIN: 1
DIARRHEA: 1

## 2019-10-05 LAB
CULTURE: NO GROWTH
Lab: NORMAL
SPECIMEN DESCRIPTION: NORMAL

## 2019-10-11 ASSESSMENT — ENCOUNTER SYMPTOMS
VOMITING: 0
WHEEZING: 0
SHORTNESS OF BREATH: 0
BLOOD IN STOOL: 0
CONSTIPATION: 0

## 2019-10-15 DIAGNOSIS — S46.912A SHOULDER STRAIN, LEFT, INITIAL ENCOUNTER: ICD-10-CM

## 2019-10-15 RX ORDER — MELOXICAM 15 MG/1
TABLET ORAL
Qty: 30 TABLET | Refills: 5 | Status: SHIPPED | OUTPATIENT
Start: 2019-10-15 | End: 2020-04-09 | Stop reason: SDUPTHER

## 2019-10-28 ENCOUNTER — HOSPITAL ENCOUNTER (OUTPATIENT)
Age: 59
Setting detail: SPECIMEN
Discharge: HOME | End: 2019-10-28
Payer: COMMERCIAL

## 2019-10-28 ENCOUNTER — OFFICE VISIT (OUTPATIENT)
Dept: FAMILY MEDICINE CLINIC | Age: 59
End: 2019-10-28
Payer: COMMERCIAL

## 2019-10-28 VITALS
BODY MASS INDEX: 23.52 KG/M2 | OXYGEN SATURATION: 98 % | HEART RATE: 70 BPM | DIASTOLIC BLOOD PRESSURE: 78 MMHG | WEIGHT: 137 LBS | SYSTOLIC BLOOD PRESSURE: 122 MMHG

## 2019-10-28 DIAGNOSIS — Z12.4 SCREENING FOR CERVICAL CANCER: ICD-10-CM

## 2019-10-28 DIAGNOSIS — Z01.411 ENCOUNTER FOR GYNECOLOGICAL EXAMINATION WITH ABNORMAL FINDING: Primary | ICD-10-CM

## 2019-10-28 DIAGNOSIS — N89.8 VAGINAL ITCHING: ICD-10-CM

## 2019-10-28 DIAGNOSIS — N76.0 ACUTE VAGINITIS: ICD-10-CM

## 2019-10-28 DIAGNOSIS — N95.2 VAGINAL ATROPHY: ICD-10-CM

## 2019-10-28 LAB
BILIRUBIN, POC: NEGATIVE
BLOOD URINE, POC: NEGATIVE
CLARITY, POC: CLEAR
COLOR, POC: NORMAL
GLUCOSE URINE, POC: NEGATIVE
KETONES, POC: NEGATIVE
LEUKOCYTE EST, POC: NEGATIVE
NITRITE, POC: NEGATIVE
PH, POC: 6
PROTEIN, POC: NEGATIVE
SPECIFIC GRAVITY, POC: 1
UROBILINOGEN, POC: 0.2

## 2019-10-28 PROCEDURE — 99396 PREV VISIT EST AGE 40-64: CPT | Performed by: NURSE PRACTITIONER

## 2019-10-28 PROCEDURE — 87086 URINE CULTURE/COLONY COUNT: CPT | Performed by: NURSE PRACTITIONER

## 2019-10-28 PROCEDURE — 87660 TRICHOMONAS VAGIN DIR PROBE: CPT

## 2019-10-28 PROCEDURE — 87480 CANDIDA DNA DIR PROBE: CPT

## 2019-10-28 PROCEDURE — 87086 URINE CULTURE/COLONY COUNT: CPT

## 2019-10-28 PROCEDURE — G0145 SCR C/V CYTO,THINLAYER,RESCR: HCPCS

## 2019-10-28 PROCEDURE — 87510 GARDNER VAG DNA DIR PROBE: CPT

## 2019-10-28 PROCEDURE — 87480 CANDIDA DNA DIR PROBE: CPT | Performed by: NURSE PRACTITIONER

## 2019-10-28 PROCEDURE — 81002 URINALYSIS NONAUTO W/O SCOPE: CPT | Performed by: NURSE PRACTITIONER

## 2019-10-28 ASSESSMENT — ENCOUNTER SYMPTOMS
SHORTNESS OF BREATH: 0
WHEEZING: 0
ABDOMINAL PAIN: 0
BLOOD IN STOOL: 0
CONSTIPATION: 0
DIARRHEA: 0
COUGH: 0

## 2019-10-29 LAB
DIRECT EXAM: NORMAL
Lab: NORMAL
SPECIMEN DESCRIPTION: NORMAL

## 2019-10-30 LAB
CULTURE: NO GROWTH
Lab: NORMAL
SPECIMEN DESCRIPTION: NORMAL

## 2019-11-01 LAB — CYTOLOGY REPORT: NORMAL

## 2019-11-04 DIAGNOSIS — N95.2 ATROPHIC VAGINITIS: Primary | ICD-10-CM

## 2019-11-04 RX ORDER — ESTRADIOL 0.1 MG/G
1 CREAM VAGINAL DAILY
Qty: 1 TUBE | Refills: 3 | Status: SHIPPED | OUTPATIENT
Start: 2019-11-04 | End: 2020-07-17

## 2020-03-11 ENCOUNTER — TELEPHONE (OUTPATIENT)
Dept: FAMILY MEDICINE CLINIC | Age: 60
End: 2020-03-11

## 2020-03-11 NOTE — TELEPHONE ENCOUNTER
Pt is looking for a cheaper alternative to    estradiol (ESTRACE VAGINAL) 0.1 MG/GM vaginal cream    Pt states \"I still have some but now that ins is no longer covering it I'd like to have something ready when I run out'.

## 2020-04-09 RX ORDER — MELOXICAM 15 MG/1
15 TABLET ORAL DAILY
Qty: 30 TABLET | Refills: 0 | Status: SHIPPED | OUTPATIENT
Start: 2020-04-09 | End: 2020-05-27

## 2020-06-18 RX ORDER — HYOSCYAMINE SULFATE 0.125 MG
TABLET,DISINTEGRATING ORAL
Qty: 30 TABLET | Refills: 5 | Status: SHIPPED | OUTPATIENT
Start: 2020-06-18 | End: 2020-09-04 | Stop reason: SDUPTHER

## 2020-07-17 RX ORDER — ESTRADIOL 0.1 MG/G
CREAM VAGINAL
Qty: 42.5 G | Refills: 1 | Status: SHIPPED | OUTPATIENT
Start: 2020-07-17 | End: 2021-02-17

## 2020-09-02 NOTE — TELEPHONE ENCOUNTER
Charley Dorado is requesting a refill on the following medication(s):  Requested Prescriptions     Pending Prescriptions Disp Refills    meloxicam (MOBIC) 15 MG tablet [Pharmacy Med Name: MELOXICAM 15 MG TABLET] 30 tablet 2     Sig: take 1 tablet by mouth once daily       Last Visit Date (If Applicable):  28/62/5820    Next Visit Date:    Visit date not found

## 2020-09-03 ENCOUNTER — TELEPHONE (OUTPATIENT)
Dept: FAMILY MEDICINE CLINIC | Age: 60
End: 2020-09-03

## 2020-09-03 RX ORDER — MELOXICAM 15 MG/1
TABLET ORAL
Qty: 30 TABLET | Refills: 2 | Status: SHIPPED | OUTPATIENT
Start: 2020-09-03 | End: 2020-11-24

## 2020-09-04 ENCOUNTER — OFFICE VISIT (OUTPATIENT)
Dept: FAMILY MEDICINE CLINIC | Age: 60
End: 2020-09-04
Payer: COMMERCIAL

## 2020-09-04 VITALS
DIASTOLIC BLOOD PRESSURE: 80 MMHG | SYSTOLIC BLOOD PRESSURE: 110 MMHG | HEART RATE: 98 BPM | WEIGHT: 136.1 LBS | RESPIRATION RATE: 16 BRPM | HEIGHT: 63 IN | BODY MASS INDEX: 24.11 KG/M2 | OXYGEN SATURATION: 98 %

## 2020-09-04 PROCEDURE — 96372 THER/PROPH/DIAG INJ SC/IM: CPT | Performed by: INTERNAL MEDICINE

## 2020-09-04 PROCEDURE — 99214 OFFICE O/P EST MOD 30 MIN: CPT | Performed by: INTERNAL MEDICINE

## 2020-09-04 RX ORDER — PREDNISONE 20 MG/1
40 TABLET ORAL DAILY
Qty: 10 TABLET | Refills: 0 | Status: SHIPPED | OUTPATIENT
Start: 2020-09-04 | End: 2020-09-09

## 2020-09-04 RX ORDER — TRIAMCINOLONE ACETONIDE 40 MG/ML
40 INJECTION, SUSPENSION INTRA-ARTICULAR; INTRAMUSCULAR ONCE
Status: COMPLETED | OUTPATIENT
Start: 2020-09-04 | End: 2020-09-04

## 2020-09-04 RX ORDER — HYDROCODONE BITARTRATE AND ACETAMINOPHEN 10; 325 MG/1; MG/1
1 TABLET ORAL EVERY 8 HOURS PRN
Qty: 30 TABLET | Refills: 0 | Status: SHIPPED | OUTPATIENT
Start: 2020-09-04 | End: 2020-09-09

## 2020-09-04 RX ORDER — TRIAMCINOLONE ACETONIDE 40 MG/ML
40 INJECTION, SUSPENSION INTRA-ARTICULAR; INTRAMUSCULAR ONCE
Qty: 1 ML | Refills: 0 | Status: SHIPPED | OUTPATIENT
Start: 2020-09-04 | End: 2020-09-04

## 2020-09-04 RX ORDER — CYCLOBENZAPRINE HCL 10 MG
10 TABLET ORAL 3 TIMES DAILY PRN
Qty: 30 TABLET | Refills: 0 | Status: SHIPPED | OUTPATIENT
Start: 2020-09-04 | End: 2020-09-14

## 2020-09-04 RX ADMIN — TRIAMCINOLONE ACETONIDE 40 MG: 40 INJECTION, SUSPENSION INTRA-ARTICULAR; INTRAMUSCULAR at 12:17

## 2020-09-04 ASSESSMENT — PATIENT HEALTH QUESTIONNAIRE - PHQ9
SUM OF ALL RESPONSES TO PHQ9 QUESTIONS 1 & 2: 0
SUM OF ALL RESPONSES TO PHQ QUESTIONS 1-9: 0
2. FEELING DOWN, DEPRESSED OR HOPELESS: 0
1. LITTLE INTEREST OR PLEASURE IN DOING THINGS: 0
SUM OF ALL RESPONSES TO PHQ QUESTIONS 1-9: 0

## 2020-09-04 NOTE — PROGRESS NOTES
kenalog     [facility] [address] [facility]  [address]  Dept: [phone]  Dept Fax: [phone]: [phone]     Visit Date:  9/4/2020    Patient:  Tristin Mendoza  YOB: 1960    HPI:   Tristin Mendoza presents today for   Chief Complaint   Patient presents with    Back Pain     Low back pain, lower left side. She noted 4 days ago after lifting grandchild. Hurts wore when sitting, ice helps. She had tried dry needling. Brina Watters HPI 67-year-old female is coming in today with more than 4-day history of worsening low back pain. Patient reports her pain started this past Monday when she was lifting up her grandchild. She tried icing it and got a little better on Tuesday. Went for dry needling for her chronic lower back on Wednesday and felt fine. On Thursday she woke up again being very stiff and the pain got aggravated after she lifted up her grandchild again. She is in a lot of excruciating pain it is hard for her to sit down as it makes it worse it is localized around the right lower lumbar back area radiating up to the front area 8 out of 10, it was so bad at night last night that she felt almost like puking going into the ER. Sharp-like sensation not traveling down her posterior legs or thigh area no numbness no tingling or no anorectal paresthesias or bowels or bladder incontinence. She is also experiencing muscle cramping spell/spasm-like sensations on Tuesday and Wednesday. Chronically she does suffers back pain but that is mostly on her left side. She takes meloxicam for arthritis for her hand pain as well as took some Tylenol for this new pain. Sitting down bending over makes it worse versus stretching her leg standing walking makes it a little better.   Sleeping on ice packs she feels very stiff and usually she exercises but it was really hard for her to exercise on a treadmill because of the excruciating pain. Medications  Prior to Visit Medications    Medication Sig Taking? Authorizing Provider   meloxicam (MOBIC) 15 MG tablet take 1 tablet by mouth once daily Yes GONZALEZ Ivory CNP   fluticasone (FLONASE) 50 MCG/ACT nasal spray INSTILL 2 SPRAYS INTO EACH NOSTRIL ONCE A DAY Yes GONZALEZ Ivory CNP   estradiol (ESTRACE) 0.1 MG/GM vaginal cream insert 1 gram vaginally once daily for 2 weeks Yes GONZALEZ Ivory CNP   cetirizine (ZYRTEC ALLERGY) 10 MG tablet Take 1 tablet by mouth daily Yes GONZALEZ Ivory CNP   diclofenac 1.5 % SOLN  Yes Historical Provider, MD   Multiple Vitamins-Minerals (BEROCCA PO) Take by mouth daily Yes Historical Provider, MD   omeprazole (PRILOSEC) 40 MG delayed release capsule Take 40 mg by mouth daily Yes Historical Provider, MD   Probiotic Product (PROBIOTIC DAILY PO) Take 1 tablet by mouth Yes Historical Provider, MD   hyoscyamine (HYOMAX-SL) 125 MCG sublingual tablet Place 125 mcg under the tongue every 4 hours as needed for Cramping Yes Historical Provider, MD        Allergies:  is allergic to sulfa antibiotics. Past Medical History:   has a past medical history of Allergic rhinitis. Past Surgical History   has a past surgical history that includes  section; Tubal ligation; ovarian cyst removal; Cholecystectomy; Upper gastrointestinal endoscopy (2011); Colonoscopy (2011); Rotator cuff repair (2012); Colonoscopy (2014); and back surgery (2017). Family History  family history is not on file. Social History   reports that she has never smoked. She has never used smokeless tobacco. She reports that she does not drink alcohol or use drugs.     Health Maintenance:    Health Maintenance   Topic Date Due    Hepatitis C screen  1960    HIV screen  1976    Shingles Vaccine (1 of 2) 2011    Flu vaccine (1) 2020    Breast cancer screen  2021    Lipid screen  2022  Cervical cancer screen  10/28/2022    Colon cancer screen colonoscopy  08/07/2024    DTaP/Tdap/Td vaccine (2 - Td) 11/02/2026    Hepatitis A vaccine  Aged Out    Hepatitis B vaccine  Aged Out    Hib vaccine  Aged Out    Meningococcal (ACWY) vaccine  Aged Out    Pneumococcal 0-64 years Vaccine  Aged Out       Subjective:      Review of Systems   Constitutional: Negative for fatigue, fever and unexpected weight change. HENT: Negative for ear pain, postnasal drip, rhinorrhea, sinus pain, sore throat and trouble swallowing. Eyes: Negative for visual disturbance. Respiratory: Negative for cough, chest tightness and shortness of breath. Cardiovascular: Negative for chest pain and leg swelling. Gastrointestinal: Negative for abdominal pain, blood in stool and diarrhea. Endocrine: Negative for polyuria. Genitourinary: Negative for difficulty urinating and flank pain. Musculoskeletal: Positive for arthralgias, back pain and myalgias. Negative for joint swelling. Low back stiffness   Skin: Negative for rash. Allergic/Immunologic: Negative for environmental allergies. Neurological: Negative for weakness, light-headedness, numbness and headaches. Hematological: Negative for adenopathy. Psychiatric/Behavioral: Negative for behavioral problems and suicidal ideas. The patient is not nervous/anxious. Objective:     /80   Pulse 98   Resp 16   Ht 5' 2.75\" (1.594 m)   Wt 136 lb 1.6 oz (61.7 kg)   SpO2 98%   BMI 24.30 kg/m²     Physical Exam  Vitals signs and nursing note reviewed. HENT:      Head: Normocephalic and atraumatic. Cardiovascular:      Rate and Rhythm: Normal rate and regular rhythm. Pulses: Normal pulses. Heart sounds: Normal heart sounds. Pulmonary:      Effort: Pulmonary effort is normal.      Breath sounds: Normal breath sounds. No stridor. Musculoskeletal:         General: Tenderness present.       Comments: Left lower lumbar paraspinal

## 2020-09-08 ASSESSMENT — ENCOUNTER SYMPTOMS
DIARRHEA: 0
TROUBLE SWALLOWING: 0
BLOOD IN STOOL: 0
COUGH: 0
CHEST TIGHTNESS: 0
BACK PAIN: 1
ABDOMINAL PAIN: 0
SORE THROAT: 0
RHINORRHEA: 0
SHORTNESS OF BREATH: 0
SINUS PAIN: 0

## 2020-09-15 ENCOUNTER — HOSPITAL ENCOUNTER (OUTPATIENT)
Dept: MAMMOGRAPHY | Age: 60
Discharge: HOME | End: 2020-09-17
Payer: COMMERCIAL

## 2020-09-15 PROCEDURE — 77063 BREAST TOMOSYNTHESIS BI: CPT

## 2020-09-30 ENCOUNTER — TELEPHONE (OUTPATIENT)
Dept: FAMILY MEDICINE CLINIC | Age: 60
End: 2020-09-30

## 2020-09-30 NOTE — TELEPHONE ENCOUNTER
We were notified that pt needed a PA for the Voltaren gel. I checked with pharmacy and pt did not fill. I spoke with pt and she says that she is better and does not need this medication.

## 2020-10-27 NOTE — PROGRESS NOTES
[address] [address] [address] [facility]  Dept: [phone]  Dept Fax: [phone]    Mary Saenz is a 62 y.o. female who presents today for her medical conditions/complaints as noted below. Mary Saenz c/o of Hand Pain (c/o right hand palm will get a burning pain and very tender to touch and can't apply pressure to palm of hand) and Skin Problem (has what she says is \"black head\" on right breast )      HPI:     Hand Pain    The incident occurred more than 1 week ago. There was no injury mechanism. The pain is present in the right hand. The quality of the pain is described as burning and aching. The pain does not radiate. The pain is moderate. The pain has been intermittent since the incident. Pertinent negatives include no chest pain, muscle weakness, numbness or tingling. She has tried nothing for the symptoms. BP Readings from Last 3 Encounters:   10/28/20 126/84   20 110/80   10/28/19 122/78              ([id number])    Pulse Readings from Last 3 Encounters:   10/28/20 82   20 98   10/28/19 70        Wt Readings from Last 3 Encounters:   10/28/20 135 lb 1.6 oz (61.3 kg)   20 136 lb 1.6 oz (61.7 kg)   10/28/19 137 lb (62.1 kg)       Past Medical History:   Diagnosis Date    Allergic rhinitis       Past Surgical History:   Procedure Laterality Date    BACK SURGERY  2017    ablation     SECTION      CHOLECYSTECTOMY      COLONOSCOPY  2011    COLONOSCOPY  2014    mild diverticulosis Dr Pippa Solano  2012    TUBAL LIGATION      UPPER GASTROINTESTINAL ENDOSCOPY  2011       No family history on file.     Social History     Tobacco Use    Smoking status: Never Smoker    Smokeless tobacco: Never Used   Substance Use Topics    Alcohol use: No      Current Outpatient Medications   Medication Sig Dispense Refill    DULoxetine (CYMBALTA) 30 MG extended release capsule Take 1 capsule by mouth daily 30 capsule 2    diclofenac sodium (VOLTAREN) 1 % GEL Apply 4 g topically 4 times daily 4 Tube 1    meloxicam (MOBIC) 15 MG tablet take 1 tablet by mouth once daily 30 tablet 2    fluticasone (FLONASE) 50 MCG/ACT nasal spray INSTILL 2 SPRAYS INTO EACH NOSTRIL ONCE A DAY 16 g 3    estradiol (ESTRACE) 0.1 MG/GM vaginal cream insert 1 gram vaginally once daily for 2 weeks 42.5 g 1    cetirizine (ZYRTEC ALLERGY) 10 MG tablet Take 1 tablet by mouth daily 30 tablet 5    diclofenac 1.5 % SOLN   0    Multiple Vitamins-Minerals (BEROCCA PO) Take by mouth daily      omeprazole (PRILOSEC) 40 MG delayed release capsule Take 40 mg by mouth daily      Probiotic Product (PROBIOTIC DAILY PO) Take 1 tablet by mouth      hyoscyamine (HYOMAX-SL) 125 MCG sublingual tablet Place 125 mcg under the tongue every 4 hours as needed for Cramping       No current facility-administered medications for this visit. Allergies   Allergen Reactions    Sulfa Antibiotics      Cream gives a rash       Health Maintenance   Topic Date Due    Hepatitis C screen  1960    Shingles Vaccine (1 of 2) 07/02/2011    Flu vaccine (1) 09/01/2020    Lipid screen  01/26/2022    Breast cancer screen  09/15/2022    Cervical cancer screen  10/28/2022    Colon cancer screen colonoscopy  08/07/2024    DTaP/Tdap/Td vaccine (2 - Td) 11/02/2026    Hepatitis A vaccine  Aged Out    Hepatitis B vaccine  Aged Out    Hib vaccine  Aged Out    Meningococcal (ACWY) vaccine  Aged Out    Pneumococcal 0-64 years Vaccine  Aged Out    HIV screen  Discontinued       Subjective:      Review of Systems   Constitutional: Negative for appetite change, chills, fatigue and fever. HENT: Negative. Respiratory: Negative for cough, shortness of breath and wheezing. Cardiovascular: Negative for chest pain. Musculoskeletal: Positive for arthralgias (right hand).    Neurological: Negative for tingling and numbness. Objective:     /84   Pulse 82   Wt 135 lb 1.6 oz (61.3 kg)   SpO2 98%   BMI 24.12 kg/m²     Physical Exam  Constitutional:       Appearance: She is well-developed. HENT:      Head: Normocephalic. Neck:      Musculoskeletal: Neck supple. Cardiovascular:      Rate and Rhythm: Normal rate and regular rhythm. Heart sounds: Normal heart sounds. Pulmonary:      Effort: Pulmonary effort is normal. No respiratory distress. Breath sounds: Normal breath sounds. Musculoskeletal:      Right hand: She exhibits tenderness (palmar surface). She exhibits normal range of motion, normal capillary refill and no swelling. Normal sensation noted. Normal strength noted. Lymphadenopathy:      Cervical: No cervical adenopathy. Neurological:      Mental Status: She is alert and oriented to person, place, and time. PHQ Scores 9/4/2020 4/24/2019 11/14/2018 10/4/2017   PHQ2 Score 0 0 0 0   PHQ9 Score 0 0 0 0     Interpretation of Total Score Depression Severity: 1-4 = Minimal depression, 5-9 = Mild depression, 10-14 = Moderate depression, 15-19 = Moderately severe depression, 20-27 = Severe depression       Lab Results   Component Value Date    LDLCALC 82.8 01/26/2017       Assessment:     1. Arthritis          Plan:       Orders Placed This Encounter   Medications    DULoxetine (CYMBALTA) 30 MG extended release capsule     Sig: Take 1 capsule by mouth daily     Dispense:  30 capsule     Refill:  2      Discussed use, benefit, and side effects of prescribed medications. All patient questions answered. Patient voiced understanding. Health Maintenance reviewed. Instructed to continue current medications, diet and exercise. Patient agreed with treatment plan. Follow up as directed. Return if symptoms worsen or fail to improve.     Electronically signed by GONZALEZ Vazquez CNP on 11/7/2020

## 2020-10-28 ENCOUNTER — OFFICE VISIT (OUTPATIENT)
Dept: FAMILY MEDICINE CLINIC | Age: 60
End: 2020-10-28
Payer: COMMERCIAL

## 2020-10-28 VITALS
WEIGHT: 135.1 LBS | HEART RATE: 82 BPM | SYSTOLIC BLOOD PRESSURE: 126 MMHG | OXYGEN SATURATION: 98 % | BODY MASS INDEX: 24.12 KG/M2 | DIASTOLIC BLOOD PRESSURE: 84 MMHG

## 2020-10-28 PROCEDURE — 99213 OFFICE O/P EST LOW 20 MIN: CPT | Performed by: NURSE PRACTITIONER

## 2020-10-28 RX ORDER — DULOXETIN HYDROCHLORIDE 30 MG/1
30 CAPSULE, DELAYED RELEASE ORAL DAILY
Qty: 30 CAPSULE | Refills: 2 | Status: SHIPPED | OUTPATIENT
Start: 2020-10-28 | End: 2021-01-14

## 2020-11-07 ASSESSMENT — ENCOUNTER SYMPTOMS
WHEEZING: 0
COUGH: 0
SHORTNESS OF BREATH: 0

## 2020-11-24 RX ORDER — MELOXICAM 15 MG/1
TABLET ORAL
Qty: 30 TABLET | Refills: 2 | Status: SHIPPED | OUTPATIENT
Start: 2020-11-24 | End: 2021-03-02

## 2020-11-24 NOTE — TELEPHONE ENCOUNTER
Lela Higginbotham is requesting a refill on the following medication(s):  Requested Prescriptions     Pending Prescriptions Disp Refills    meloxicam (MOBIC) 15 MG tablet [Pharmacy Med Name: MELOXICAM 15 MG TABLET] 30 tablet 2     Sig: take 1 tablet by mouth once daily       Last Visit Date (If Applicable):  59/52/2932    Next Visit Date:    Visit date not found

## 2021-01-13 ENCOUNTER — HOSPITAL ENCOUNTER (OUTPATIENT)
Age: 61
Setting detail: SPECIMEN
Discharge: HOME | End: 2021-01-13
Payer: COMMERCIAL

## 2021-01-13 DIAGNOSIS — Z86.19 HISTORY OF VIRAL ILLNESS: ICD-10-CM

## 2021-01-13 DIAGNOSIS — Z86.19 HISTORY OF VIRAL ILLNESS: Primary | ICD-10-CM

## 2021-01-13 LAB — SARS-COV-2 ANTIBODY, TOTAL: NEGATIVE

## 2021-01-13 PROCEDURE — 36415 COLL VENOUS BLD VENIPUNCTURE: CPT

## 2021-01-13 PROCEDURE — 86769 SARS-COV-2 COVID-19 ANTIBODY: CPT

## 2021-01-14 DIAGNOSIS — M19.90 ARTHRITIS: ICD-10-CM

## 2021-01-14 RX ORDER — DULOXETIN HYDROCHLORIDE 30 MG/1
CAPSULE, DELAYED RELEASE ORAL
Qty: 30 CAPSULE | Refills: 2 | Status: SHIPPED | OUTPATIENT
Start: 2021-01-14 | End: 2021-04-14

## 2021-01-14 NOTE — TELEPHONE ENCOUNTER
Louis Askew is requesting a refill on the following medication(s):  Requested Prescriptions     Pending Prescriptions Disp Refills    DULoxetine (CYMBALTA) 30 MG extended release capsule [Pharmacy Med Name: DULOXETINE HCL DR 30 MG CAP] 30 capsule 2     Sig: take 1 capsule by mouth once daily       Last Visit Date (If Applicable):  14/76/8308    Next Visit Date:    Visit date not found

## 2021-02-17 ENCOUNTER — OFFICE VISIT (OUTPATIENT)
Dept: FAMILY MEDICINE CLINIC | Age: 61
End: 2021-02-17
Payer: COMMERCIAL

## 2021-02-17 VITALS
SYSTOLIC BLOOD PRESSURE: 100 MMHG | BODY MASS INDEX: 24.64 KG/M2 | WEIGHT: 138 LBS | HEART RATE: 93 BPM | OXYGEN SATURATION: 97 % | DIASTOLIC BLOOD PRESSURE: 70 MMHG

## 2021-02-17 DIAGNOSIS — L30.9 DERMATITIS: Primary | ICD-10-CM

## 2021-02-17 PROCEDURE — 99213 OFFICE O/P EST LOW 20 MIN: CPT | Performed by: FAMILY MEDICINE

## 2021-02-17 RX ORDER — PREDNISONE 20 MG/1
20 TABLET ORAL DAILY
Qty: 5 TABLET | Refills: 0 | Status: SHIPPED | OUTPATIENT
Start: 2021-02-17 | End: 2021-02-22

## 2021-02-17 RX ORDER — ESTRADIOL 0.1 MG/G
CREAM VAGINAL
COMMUNITY
Start: 2020-12-02 | End: 2021-04-22

## 2021-02-17 ASSESSMENT — ENCOUNTER SYMPTOMS
EYE PAIN: 0
COUGH: 0
SORE THROAT: 0
VOMITING: 0
RHINORRHEA: 0
SHORTNESS OF BREATH: 0
DIARRHEA: 0

## 2021-02-17 ASSESSMENT — PATIENT HEALTH QUESTIONNAIRE - PHQ9
SUM OF ALL RESPONSES TO PHQ QUESTIONS 1-9: 0
1. LITTLE INTEREST OR PLEASURE IN DOING THINGS: 0
SUM OF ALL RESPONSES TO PHQ QUESTIONS 1-9: 0

## 2021-02-17 NOTE — PROGRESS NOTES
[facility]  [address]  Dept: [phone]  Dept GRU:[phone]    Shubham Simental is a 62 y.o. female who presents today for her medical conditions/complaints as notedbelow. Shubham Simental is c/o of Rash (on torso )      HPI:     Started on the left torso and now spreading to the other side. NO pain, slightly itchy. Eucerin soothes. Is using a different fabric softner also,  Also a lot of GI issues, did use a senna and has been using 2 per day for the last few days and wonders if this is contributing. No recent ill contacts. No other symptoms. HAs a vacation planned and worried about this. Rash  This is a new problem. The current episode started in the past 7 days (Monday ). The affected locations include the torso. Pertinent negatives include no anorexia, congestion, cough, diarrhea, eye pain, facial edema, fatigue, fever, joint pain, rhinorrhea, shortness of breath, sore throat or vomiting. Past treatments include moisturizer. The treatment provided mild relief. BP Readings from Last 3 Encounters:   21 100/70   10/28/20 126/84   20 110/80          (goal 120/80)    Wt Readings from Last 3 Encounters:   21 138 lb (62.6 kg)   10/28/20 135 lb 1.6 oz (61.3 kg)   20 136 lb 1.6 oz (61.7 kg)        Past Medical History:   Diagnosis Date    Allergic rhinitis       Past Surgical History:   Procedure Laterality Date    BACK SURGERY  2017    ablation     SECTION      CHOLECYSTECTOMY      COLONOSCOPY  2011    COLONOSCOPY  2014    mild diverticulosis Dr Genevieve Webb  2012    TUBAL LIGATION      UPPER GASTROINTESTINAL ENDOSCOPY  2011       History reviewed. No pertinent family history. Social History     Tobacco Use    Smoking status: Never Smoker    Smokeless tobacco: Never Used   Substance Use Topics    Alcohol use:  No Current Outpatient Medications   Medication Sig Dispense Refill    estradiol (ESTRACE) 0.1 MG/GM vaginal cream insert 1 gram vaginally once daily for 2 weeks      predniSONE (DELTASONE) 20 MG tablet Take 1 tablet by mouth daily for 5 days 5 tablet 0    DULoxetine (CYMBALTA) 30 MG extended release capsule take 1 capsule by mouth once daily 30 capsule 2    meloxicam (MOBIC) 15 MG tablet take 1 tablet by mouth once daily 30 tablet 2    diclofenac sodium (VOLTAREN) 1 % GEL Apply 4 g topically 4 times daily 4 Tube 1    fluticasone (FLONASE) 50 MCG/ACT nasal spray INSTILL 2 SPRAYS INTO EACH NOSTRIL ONCE A DAY 16 g 3    cetirizine (ZYRTEC ALLERGY) 10 MG tablet Take 1 tablet by mouth daily 30 tablet 5    diclofenac 1.5 % SOLN   0    Multiple Vitamins-Minerals (BEROCCA PO) Take by mouth daily      omeprazole (PRILOSEC) 40 MG delayed release capsule Take 40 mg by mouth daily      Probiotic Product (PROBIOTIC DAILY PO) Take 1 tablet by mouth      hyoscyamine (HYOMAX-SL) 125 MCG sublingual tablet Place 125 mcg under the tongue every 4 hours as needed for Cramping       No current facility-administered medications for this visit. Allergies   Allergen Reactions    Sulfa Antibiotics      Cream gives a rash       Health Maintenance   Topic Date Due    Hepatitis C screen  1960    Shingles Vaccine (1 of 2) 07/02/2011    Flu vaccine (1) 09/01/2020    Lipid screen  01/26/2022    Breast cancer screen  09/15/2022    Cervical cancer screen  10/28/2022    Colon cancer screen colonoscopy  08/07/2024    DTaP/Tdap/Td vaccine (2 - Td) 11/02/2026    Hepatitis A vaccine  Aged Out    Hepatitis B vaccine  Aged Out    Hib vaccine  Aged Out    Meningococcal (ACWY) vaccine  Aged Out    Pneumococcal 0-64 years Vaccine  Aged Out    HIV screen  Discontinued       Subjective:      Review of Systems   Constitutional: Negative for fatigue and fever. HENT: Negative for congestion, rhinorrhea and sore throat. of this note were completed with a voice-recognition program. Efforts were made to edit the dictation but occasionally words are mis-transcribed.)    Electronically signed by Matty Sherwood MD on 2/21/2021

## 2021-03-02 DIAGNOSIS — S46.912A SHOULDER STRAIN, LEFT, INITIAL ENCOUNTER: ICD-10-CM

## 2021-03-02 RX ORDER — MELOXICAM 15 MG/1
TABLET ORAL
Qty: 30 TABLET | Refills: 2 | Status: SHIPPED | OUTPATIENT
Start: 2021-03-02 | End: 2021-05-25

## 2021-03-02 NOTE — TELEPHONE ENCOUNTER
Douglas Landis is calling to request a refill on the following medication(s):  Requested Prescriptions     Pending Prescriptions Disp Refills    meloxicam (MOBIC) 15 MG tablet [Pharmacy Med Name: MELOXICAM 15 MG TABLET] 30 tablet 2     Sig: take 1 tablet by mouth once daily       Last Visit Date (If Applicable):  5/74/3967    Next Visit Date:    Visit date not found

## 2021-04-14 DIAGNOSIS — M19.90 ARTHRITIS: ICD-10-CM

## 2021-04-14 RX ORDER — DULOXETIN HYDROCHLORIDE 30 MG/1
CAPSULE, DELAYED RELEASE ORAL
Qty: 30 CAPSULE | Refills: 2 | Status: SHIPPED | OUTPATIENT
Start: 2021-04-14 | End: 2021-07-15

## 2021-04-15 DIAGNOSIS — M19.031 ARTHRITIS OF BOTH WRISTS: Primary | ICD-10-CM

## 2021-04-15 DIAGNOSIS — M19.032 ARTHRITIS OF BOTH WRISTS: Primary | ICD-10-CM

## 2021-04-20 DIAGNOSIS — M19.032 ARTHRITIS OF BOTH WRISTS: Primary | ICD-10-CM

## 2021-04-20 DIAGNOSIS — M19.031 ARTHRITIS OF BOTH WRISTS: Primary | ICD-10-CM

## 2021-04-22 DIAGNOSIS — N95.2 ATROPHIC VAGINITIS: Primary | ICD-10-CM

## 2021-04-22 RX ORDER — ESTRADIOL 0.1 MG/G
CREAM VAGINAL
Qty: 42.5 G | Refills: 0 | Status: SHIPPED | OUTPATIENT
Start: 2021-04-22 | End: 2021-12-15

## 2021-04-22 NOTE — TELEPHONE ENCOUNTER
Paramjit Santos is calling to request a refill on the following medication(s):  Requested Prescriptions     Pending Prescriptions Disp Refills    estradiol (ESTRACE) 0.1 MG/GM vaginal cream [Pharmacy Med Name: ESTRADIOL 0.01% CREAM] 42.5 g      Sig: insert 1 gram vaginally once daily for 2 weeks       Last Visit Date (If Applicable):  3[birth year]63/1959    Next Visit Date:    Visit date not found

## 2021-05-25 DIAGNOSIS — S46.912A SHOULDER STRAIN, LEFT, INITIAL ENCOUNTER: ICD-10-CM

## 2021-05-25 RX ORDER — MELOXICAM 15 MG/1
TABLET ORAL
Qty: 30 TABLET | Refills: 2 | Status: SHIPPED | OUTPATIENT
Start: 2021-05-25 | End: 2021-09-07

## 2021-05-25 NOTE — TELEPHONE ENCOUNTER
Caesar Mansfield is calling to request a refill on the following medication(s):  Requested Prescriptions     Pending Prescriptions Disp Refills    meloxicam (MOBIC) 15 MG tablet [Pharmacy Med Name: MELOXICAM 15 MG TABLET] 30 tablet 2     Sig: take 1 tablet by mouth once daily       Last Visit Date (If Applicable):  5/31/8877    Next Visit Date:    Visit date not found

## 2021-07-06 DIAGNOSIS — R10.9 ABDOMINAL CRAMPING: Primary | ICD-10-CM

## 2021-07-06 NOTE — TELEPHONE ENCOUNTER
Fiorella Dunlap is requesting a refill on the following medication(s):  Requested Prescriptions     Pending Prescriptions Disp Refills    hyoscyamine (LEVSIN/SL) 125 MCG sublingual tablet 90 tablet      Sig: Place 1 tablet under the tongue every 4 hours as needed for Cramping       Last Visit Date (If Applicable):  9/27/3291    Next Visit Date:    Visit date not found

## 2021-07-15 DIAGNOSIS — M19.90 ARTHRITIS: ICD-10-CM

## 2021-07-15 RX ORDER — DULOXETIN HYDROCHLORIDE 30 MG/1
CAPSULE, DELAYED RELEASE ORAL
Qty: 30 CAPSULE | Refills: 2 | Status: SHIPPED | OUTPATIENT
Start: 2021-07-15 | End: 2021-10-19

## 2021-07-27 DIAGNOSIS — H65.03 BILATERAL ACUTE SEROUS OTITIS MEDIA, RECURRENCE NOT SPECIFIED: ICD-10-CM

## 2021-07-28 RX ORDER — FLUTICASONE PROPIONATE 50 MCG
SPRAY, SUSPENSION (ML) NASAL
Qty: 16 G | Refills: 3 | Status: SHIPPED | OUTPATIENT
Start: 2021-07-28

## 2021-07-28 NOTE — TELEPHONE ENCOUNTER
Corby Chandra is requesting a refill on the following medication(s):  Requested Prescriptions     Pending Prescriptions Disp Refills    fluticasone (FLONASE) 50 MCG/ACT nasal spray [Pharmacy Med Name: FLUTICASONE PROP 50 MCG SPRAY] 16 g 3     Sig: instill 2 spray into each nostril once daily       Last Visit Date (If Applicable):  67/64/8678    Next Visit Date:    Visit date not found

## 2021-09-10 ENCOUNTER — TELEPHONE (OUTPATIENT)
Dept: MAMMOGRAPHY | Age: 61
End: 2021-09-10

## 2021-09-10 DIAGNOSIS — Z12.31 ENCOUNTER FOR SCREENING MAMMOGRAM FOR BREAST CANCER: Primary | ICD-10-CM

## 2021-09-29 ENCOUNTER — OFFICE VISIT (OUTPATIENT)
Dept: FAMILY MEDICINE CLINIC | Age: 61
End: 2021-09-29
Payer: COMMERCIAL

## 2021-09-29 VITALS
BODY MASS INDEX: 25.71 KG/M2 | DIASTOLIC BLOOD PRESSURE: 80 MMHG | OXYGEN SATURATION: 98 % | SYSTOLIC BLOOD PRESSURE: 132 MMHG | WEIGHT: 144 LBS | HEART RATE: 58 BPM

## 2021-09-29 DIAGNOSIS — M19.032 ARTHRITIS OF BOTH WRISTS: ICD-10-CM

## 2021-09-29 DIAGNOSIS — M19.031 ARTHRITIS OF BOTH WRISTS: ICD-10-CM

## 2021-09-29 DIAGNOSIS — N95.2 ATROPHIC VAGINITIS: ICD-10-CM

## 2021-09-29 DIAGNOSIS — J30.2 SEASONAL ALLERGIC RHINITIS, UNSPECIFIED TRIGGER: ICD-10-CM

## 2021-09-29 DIAGNOSIS — K21.9 GASTROESOPHAGEAL REFLUX DISEASE, UNSPECIFIED WHETHER ESOPHAGITIS PRESENT: ICD-10-CM

## 2021-09-29 DIAGNOSIS — Z13.220 SCREENING FOR HYPERLIPIDEMIA: ICD-10-CM

## 2021-09-29 DIAGNOSIS — Z00.00 ENCOUNTER FOR WELLNESS EXAMINATION IN ADULT: Primary | ICD-10-CM

## 2021-09-29 DIAGNOSIS — M19.012 PRIMARY OSTEOARTHRITIS OF LEFT SHOULDER: ICD-10-CM

## 2021-09-29 PROCEDURE — 99396 PREV VISIT EST AGE 40-64: CPT | Performed by: NURSE PRACTITIONER

## 2021-09-29 SDOH — ECONOMIC STABILITY: FOOD INSECURITY: WITHIN THE PAST 12 MONTHS, THE FOOD YOU BOUGHT JUST DIDN'T LAST AND YOU DIDN'T HAVE MONEY TO GET MORE.: NEVER TRUE

## 2021-09-29 SDOH — ECONOMIC STABILITY: FOOD INSECURITY: WITHIN THE PAST 12 MONTHS, YOU WORRIED THAT YOUR FOOD WOULD RUN OUT BEFORE YOU GOT MONEY TO BUY MORE.: NEVER TRUE

## 2021-09-29 ASSESSMENT — SOCIAL DETERMINANTS OF HEALTH (SDOH): HOW HARD IS IT FOR YOU TO PAY FOR THE VERY BASICS LIKE FOOD, HOUSING, MEDICAL CARE, AND HEATING?: NOT HARD AT ALL

## 2021-09-29 NOTE — PROGRESS NOTES
[address] [address] [address] [facility]  Dept: [phone]  Dept Fax: [phone]    Jeraline Mcardle is a 62 y.o. female who presents today for her medical conditions/complaints as noted below. Jeraline Mcardle c/o of Follow-up (reports was having intense pain under right breast and goes away after 5 min was taking a supplement for weight and has since stopped it and now pain has subsided some but at night is when she feels it most and is controlled with meds and tums)      HPI:   Patient presents to the office for routine wellness visit. She reports having increase pain under the right breast with taking a supplement for weight loss. She discontinued the supplement and pain has resolved. She recently recovered from COVID-19. She walks daily at least 1 mile and works out at the gym 2 times weekly. Gastroesophageal Reflux  She reports no abdominal pain, no belching, no chest pain, no choking, no coughing, no heartburn, no hoarse voice, no nausea, no sore throat or no wheezing. This is a chronic problem. Episode onset: Usually occurs after dinner. The problem occurs occasionally. The heartburn does not wake her from sleep. The heartburn does not limit her activity. The heartburn changes with position. The symptoms are aggravated by certain foods. Pertinent negatives include no anemia, fatigue, melena, muscle weakness, orthopnea or weight loss. Risk factors include NSAIDs. She has tried a PPI for the symptoms. The treatment provided significant relief.      The 10-year ASCVD risk score (Ama Gonzalez, et al., 2013) is: 3.1%    Values used to calculate the score:      Age: 62 years      Sex: Female      Is Non- : No      Diabetic: No      Tobacco smoker: No      Systolic Blood Pressure: 704 mmHg      Is BP treated: No      HDL Cholesterol: 84 mg/dL      Total Cholesterol: 246 mg/dL      BP Readings from Last 3 Encounters: 21 132/80   21 100/70   10/28/20 126/84              ([id number])    Pulse Readings from Last 3 Encounters:   21 58   21 93   10/28/20 82        Wt Readings from Last 3 Encounters:   21 144 lb (65.3 kg)   21 138 lb (62.6 kg)   10/28/20 135 lb 1.6 oz (61.3 kg)       Past Medical History:   Diagnosis Date    Allergic rhinitis       Past Surgical History:   Procedure Laterality Date    BACK SURGERY  2017    ablation     SECTION      CHOLECYSTECTOMY      COLONOSCOPY  2011    COLONOSCOPY  2014    mild diverticulosis Dr [address]  2012    TUBAL LIGATION      UPPER GASTROINTESTINAL ENDOSCOPY  2011       No family history on file.     Social History     Tobacco Use    Smoking status: Never Smoker    Smokeless tobacco: Never Used   Vaping Use    Vaping Use: Never used   Substance Use Topics    Alcohol use: No    Drug use: No      Current Outpatient Medications   Medication Sig Dispense Refill    fluticasone (FLONASE) 50 MCG/ACT nasal spray instill 2 spray into each nostril once daily 16 g 3    DULoxetine (CYMBALTA) 30 MG extended release capsule take 1 capsule by mouth once daily 30 capsule 2    hyoscyamine (LEVSIN/SL) 125 MCG sublingual tablet Place 1 tablet under the tongue every 4 hours as needed for Cramping 90 tablet 3    estradiol (ESTRACE) 0.1 MG/GM vaginal cream insert 1 gram vaginally once daily for 2 weeks 42.5 g 0    diclofenac sodium (VOLTAREN) 1 % GEL Apply 4 g topically 4 times daily 4 Tube 1    cetirizine (ZYRTEC ALLERGY) 10 MG tablet Take 1 tablet by mouth daily 30 tablet 5    diclofenac 1.5 % SOLN   0    Multiple Vitamins-Minerals (BEROCCA PO) Take by mouth daily      omeprazole (PRILOSEC) 40 MG delayed release capsule Take 40 mg by mouth daily      Probiotic Product (PROBIOTIC DAILY PO) Take 1 tablet by mouth      hyoscyamine (HYOMAX-SL) 125 MCG sublingual tablet Place 125 mcg under the tongue every 4 hours as needed for Cramping      meloxicam (MOBIC) 15 MG tablet take 1 tablet by mouth once daily 30 tablet 0     No current facility-administered medications for this visit. Allergies   Allergen Reactions    Sulfa Antibiotics      Cream gives a rash       Health Maintenance   Topic Date Due    Hepatitis C screen  Never done    Shingles Vaccine (1 of 2) Never done    Flu vaccine (1) Never done    Cervical cancer screen  10/28/2022    Breast cancer screen  09/20/2023    Colon cancer screen colonoscopy  08/07/2024    Lipid screen  10/08/2026    DTaP/Tdap/Td vaccine (2 - Td or Tdap) 11/02/2026    COVID-19 Vaccine  Completed    Hepatitis A vaccine  Aged Out    Hepatitis B vaccine  Aged Out    Hib vaccine  Aged Out    Meningococcal (ACWY) vaccine  Aged Out    Pneumococcal 0-64 years Vaccine  Aged Out    HIV screen  Discontinued       Subjective:      Review of Systems   Constitutional: Negative for appetite change, chills, fatigue, fever and weight loss. HENT: Negative. Negative for hoarse voice and sore throat. Eyes: Negative. Respiratory: Negative for cough, choking, shortness of breath and wheezing. Cardiovascular: Negative for chest pain, palpitations and leg swelling. Gastrointestinal: Negative for abdominal pain, constipation, diarrhea, heartburn, melena and nausea. Endocrine: Negative for cold intolerance, heat intolerance, polydipsia, polyphagia and polyuria. Genitourinary: Negative. Musculoskeletal: Positive for arthralgias (bilateral wrist, left shoulder). Negative for myalgias and muscle weakness. Skin: Negative. Allergic/Immunologic: Positive for environmental allergies. Negative for food allergies. Neurological: Negative for dizziness, weakness, light-headedness and headaches. Psychiatric/Behavioral: Negative for agitation, dysphoric mood, self-injury, sleep disturbance and suicidal ideas. The patient is not nervous/anxious. Objective:     Vitals:    09/29/21 1027   BP: 132/80   Pulse: 58   SpO2: 98%   Weight: 144 lb (65.3 kg)        Estimated body mass index is 25.71 kg/m² as calculated from the following:    Height as of 9/4/20: 5' 2.75\" (1.594 m). Weight as of this encounter: 144 lb (65.3 kg). Physical Exam  Constitutional:       Appearance: Normal appearance. She is well-developed and well-groomed. HENT:      Head: Normocephalic. Nose: Nose normal.      Mouth/Throat:      Mouth: Mucous membranes are moist.      Pharynx: Oropharynx is clear. Eyes:      Conjunctiva/sclera: Conjunctivae normal.      Pupils: Pupils are equal, round, and reactive to light. Neck:      Thyroid: No thyromegaly. Vascular: No carotid bruit. Cardiovascular:      Rate and Rhythm: Normal rate and regular rhythm. Heart sounds: Normal heart sounds. Pulmonary:      Effort: Pulmonary effort is normal.      Breath sounds: Normal breath sounds. No wheezing. Abdominal:      General: Bowel sounds are normal.      Palpations: Abdomen is soft. Tenderness: There is no abdominal tenderness. Musculoskeletal:      Cervical back: Neck supple. Right lower leg: No edema. Left lower leg: No edema. Lymphadenopathy:      Cervical: No cervical adenopathy. Skin:     Capillary Refill: Capillary refill takes less than 2 seconds. Neurological:      Mental Status: She is alert and oriented to person, place, and time. Gait: Gait normal.   Psychiatric:         Mood and Affect: Mood normal.         Behavior: Behavior is cooperative. PHQ Scores 2/17/2021 9/4/2020 4/24/2019 11/14/2018 10/4/2017   PHQ2 Score 0 0 0 0 0   PHQ9 Score 0 0 0 0 0     Interpretation of Total Score Depression Severity: 1-4 = Minimal depression, 5-9 = Mild depression, 10-14 = Moderate depression, 15-19 = Moderately severe depression, 20-27 = Severe depression       Assessment:     1. Encounter for wellness examination in adult    2. Gastroesophageal reflux disease, unspecified whether esophagitis present    3. Primary osteoarthritis of left shoulder    4. Arthritis of both wrists    5. Atrophic vaginitis    6. Seasonal allergic rhinitis, unspecified trigger    7. Screening for hyperlipidemia        Plan:     Orders Placed This Encounter   Procedures    Lipid, Fasting     Standing Status:   Future     Standing Expiration Date:   9/29/2022    CBC Auto Differential     Standing Status:   Future     Standing Expiration Date:   11/28/2021    CBC Auto Differential    Basic Metabolic Panel    Lipid Panel        Patient given educational materials - see patient instructions. Discussed use, benefit, and side effects of prescribed medications. All patient questions answered. Patient voiced understanding. Health Maintenance reviewed. Instructed to continue current medications, diet and exercise. Patient agreed with treatment plan. Follow up as directed. Return in about 1 year (around 9/29/2022). This note was generated completely or in part utilizing Dragon dictation speech recognition software. Occasionally, words are mistranscribed and despite editing, the text may contain inaccuracies due to incorrect word recognition. If further clarification is needed please contact the office at [phone].     Electronically signed by GONZALEZ Jesus CNP on 10/10/2021

## 2021-10-07 DIAGNOSIS — S46.912A SHOULDER STRAIN, LEFT, INITIAL ENCOUNTER: ICD-10-CM

## 2021-10-07 RX ORDER — MELOXICAM 15 MG/1
TABLET ORAL
Qty: 30 TABLET | Refills: 0 | Status: SHIPPED | OUTPATIENT
Start: 2021-10-07 | End: 2021-11-18

## 2021-10-07 NOTE — TELEPHONE ENCOUNTER
India Bridges is calling to request a refill on the following medication(s):  Requested Prescriptions     Pending Prescriptions Disp Refills    meloxicam (MOBIC) 15 MG tablet [Pharmacy Med Name: MELOXICAM 15 MG TABLET] 30 tablet 0     Sig: take 1 tablet by mouth once daily       Last Visit Date (If Applicable):  0/15/2380    Next Visit Date:    Visit date not found

## 2021-10-08 LAB
ANION GAP SERPL CALCULATED.3IONS-SCNC: 10.5 MMOL/L
BASOPHILS %: 1.73 (ref 0–3)
BASOPHILS ABSOLUTE: 0.1 (ref 0–0.3)
BUN BLDV-MCNC: 14 MG/DL (ref 7–17)
CALCIUM SERPL-MCNC: 9.8 MG/DL (ref 8.4–10.2)
CHLORIDE BLD-SCNC: 102 MMOL/L (ref 98–120)
CHOLESTEROL/HDL RATIO: 2.93 RATIO (ref 0–4.5)
CHOLESTEROL: 246 MG/DL (ref 50–200)
CO2: 32 MMOL/L (ref 22–31)
CREAT SERPL-MCNC: 0.8 MG/DL (ref 0.5–1)
EOSINOPHILS %: 7.24 (ref 0–10)
EOSINOPHILS ABSOLUTE: 0.42 (ref 0–1.1)
GFR CALCULATED: > 60
GLUCOSE: 90 MG/DL (ref 65–105)
HCT VFR BLD CALC: 40 % (ref 37–47)
HDLC SERPL-MCNC: 84 MG/DL (ref 36–68)
HEMOGLOBIN: 13.8 (ref 12–16)
LDL CHOLESTEROL CALCULATED: 142.8 MG/DL (ref 0–160)
LYMPHOCYTE %: 32.1 (ref 20–51.1)
LYMPHOCYTES ABSOLUTE: 1.85 (ref 1–5.5)
MCH RBC QN AUTO: 30.4 PG (ref 28.5–32.5)
MCHC RBC AUTO-ENTMCNC: 34.5 G/DL (ref 32–37)
MCV RBC AUTO: 88.1 FL (ref 80–94)
MONOCYTES %: 7.36 (ref 1.7–9.3)
MONOCYTES ABSOLUTE: 0.42 (ref 0.1–1)
NEUTROPHILS %: 51.57 (ref 42.2–75.2)
NEUTROPHILS ABSOLUTE: 2.98 (ref 2–8.1)
PDW BLD-RTO: 10.7 % (ref 8.5–15.5)
PLATELET # BLD: 257.3 THOU/MM3 (ref 130–400)
POTASSIUM SERPL-SCNC: 4.5 MMOL/L (ref 3.6–5)
RBC: 4.54 M/UL (ref 4.2–5.4)
SODIUM BLD-SCNC: 140 MMOL/L (ref 135–145)
TRIGL SERPL-MCNC: 96 MG/DL (ref 10–250)
VLDLC SERPL CALC-MCNC: 19 MG/DL (ref 0–50)
WBC: 5.8 THOU/ML3 (ref 4.8–10.8)

## 2021-10-10 PROBLEM — N95.2 ATROPHIC VAGINITIS: Status: ACTIVE | Noted: 2021-10-10

## 2021-10-10 PROBLEM — K21.9 GASTROESOPHAGEAL REFLUX DISEASE: Status: ACTIVE | Noted: 2021-10-10

## 2021-10-10 PROBLEM — J30.2 SEASONAL ALLERGIC RHINITIS: Status: ACTIVE | Noted: 2021-10-10

## 2021-10-10 PROBLEM — M19.012 PRIMARY OSTEOARTHRITIS OF LEFT SHOULDER: Status: ACTIVE | Noted: 2021-10-10

## 2021-10-10 PROBLEM — M19.031 ARTHRITIS OF BOTH WRISTS: Status: ACTIVE | Noted: 2021-10-10

## 2021-10-10 PROBLEM — M19.032 ARTHRITIS OF BOTH WRISTS: Status: ACTIVE | Noted: 2021-10-10

## 2021-10-10 ASSESSMENT — ENCOUNTER SYMPTOMS
CHOKING: 0
CONSTIPATION: 0
HEARTBURN: 0
EYES NEGATIVE: 1
DIARRHEA: 0
NAUSEA: 0
SHORTNESS OF BREATH: 0
BELCHING: 0
SORE THROAT: 0
WHEEZING: 0
HOARSE VOICE: 0
COUGH: 0
ABDOMINAL PAIN: 0

## 2021-10-18 DIAGNOSIS — M19.90 ARTHRITIS: ICD-10-CM

## 2021-10-19 RX ORDER — DULOXETIN HYDROCHLORIDE 30 MG/1
CAPSULE, DELAYED RELEASE ORAL
Qty: 30 CAPSULE | Refills: 2 | Status: SHIPPED | OUTPATIENT
Start: 2021-10-19 | End: 2021-10-26 | Stop reason: SDUPTHER

## 2021-10-19 NOTE — TELEPHONE ENCOUNTER
Kamaljit Reid is calling to request a refill on the following medication(s):  Requested Prescriptions     Pending Prescriptions Disp Refills    DULoxetine (CYMBALTA) 30 MG extended release capsule [Pharmacy Med Name: DULOXETINE HCL DR 30 MG CAP] 30 capsule 2     Sig: take 1 tablet by mouth once daily       Last Visit Date (If Applicable):  8/19/8865    Next Visit Date:    Visit date not found

## 2021-10-26 DIAGNOSIS — M19.90 ARTHRITIS: ICD-10-CM

## 2021-10-27 RX ORDER — DULOXETIN HYDROCHLORIDE 30 MG/1
30 CAPSULE, DELAYED RELEASE ORAL DAILY
Qty: 30 CAPSULE | Refills: 3 | Status: SHIPPED | OUTPATIENT
Start: 2021-10-27 | End: 2022-02-17

## 2021-10-27 NOTE — TELEPHONE ENCOUNTER
Seun Ochoa is calling to request a refill on the following medication(s):  Requested Prescriptions     Pending Prescriptions Disp Refills    DULoxetine (CYMBALTA) 30 MG extended release capsule 30 capsule 2       Last Visit Date (If Applicable):  9/67/7025    Next Visit Date:    Visit date not found

## 2021-12-14 DIAGNOSIS — N95.2 ATROPHIC VAGINITIS: ICD-10-CM

## 2021-12-15 RX ORDER — ESTRADIOL 0.1 MG/G
CREAM VAGINAL
Qty: 42.5 G | Refills: 0 | Status: SHIPPED | OUTPATIENT
Start: 2021-12-15

## 2021-12-15 NOTE — TELEPHONE ENCOUNTER
Silvia Goodson is calling to request a refill on the following medication(s):  Requested Prescriptions     Pending Prescriptions Disp Refills    estradiol (ESTRACE) 0.1 MG/GM vaginal cream [Pharmacy Med Name: ESTRADIOL 0.01% CREAM] 42.5 g 0     Sig: insert 1 gram vaginally once daily for 2 weeks       Last Visit Date (If Applicable):  9/19/8830    Next Visit Date:    Visit date not found

## 2022-02-17 DIAGNOSIS — M19.90 ARTHRITIS: ICD-10-CM

## 2022-02-17 RX ORDER — DULOXETIN HYDROCHLORIDE 30 MG/1
30 CAPSULE, DELAYED RELEASE ORAL DAILY
Qty: 30 CAPSULE | Refills: 5 | Status: SHIPPED | OUTPATIENT
Start: 2022-02-17 | End: 2022-08-22

## 2022-02-17 NOTE — TELEPHONE ENCOUNTER
Freda Palma is requesting a refill on the following medication(s):  Requested Prescriptions     Pending Prescriptions Disp Refills    DULoxetine (CYMBALTA) 30 MG extended release capsule [Pharmacy Med Name: DULOXETINE HCL DR 30 MG CAP] 30 capsule 3     Sig: TAKE 1 CAPSULE BY MOUTH DAILY       Last Visit Date (If Applicable):  7/00/1990    Next Visit Date:    Visit date not found
no

## 2022-02-20 DIAGNOSIS — S46.912A SHOULDER STRAIN, LEFT, INITIAL ENCOUNTER: ICD-10-CM

## 2022-02-21 NOTE — TELEPHONE ENCOUNTER
Ayan Mccabe is requesting a refill on the following medication(s):  Requested Prescriptions     Pending Prescriptions Disp Refills    meloxicam (MOBIC) 15 MG tablet [Pharmacy Med Name: MELOXICAM 15 MG TABLET] 30 tablet 2     Sig: take 1 tablet by mouth once daily       Last Visit Date (If Applicable):  4/17/1576    Next Visit Date:    Visit date not found

## 2022-02-22 RX ORDER — MELOXICAM 15 MG/1
TABLET ORAL
Qty: 30 TABLET | Refills: 2 | Status: SHIPPED | OUTPATIENT
Start: 2022-02-22 | End: 2022-05-23

## 2022-05-11 ENCOUNTER — OFFICE VISIT (OUTPATIENT)
Dept: FAMILY MEDICINE CLINIC | Age: 62
End: 2022-05-11
Payer: COMMERCIAL

## 2022-05-11 VITALS
DIASTOLIC BLOOD PRESSURE: 76 MMHG | WEIGHT: 140 LBS | SYSTOLIC BLOOD PRESSURE: 118 MMHG | HEIGHT: 63 IN | HEART RATE: 77 BPM | OXYGEN SATURATION: 97 % | BODY MASS INDEX: 24.8 KG/M2

## 2022-05-11 DIAGNOSIS — M79.89 MASS OF SOFT TISSUE OF RIGHT UPPER EXTREMITY: Primary | ICD-10-CM

## 2022-05-11 PROCEDURE — 99213 OFFICE O/P EST LOW 20 MIN: CPT | Performed by: NURSE PRACTITIONER

## 2022-05-11 ASSESSMENT — PATIENT HEALTH QUESTIONNAIRE - PHQ9
SUM OF ALL RESPONSES TO PHQ9 QUESTIONS 1 & 2: 0
SUM OF ALL RESPONSES TO PHQ QUESTIONS 1-9: 0
SUM OF ALL RESPONSES TO PHQ QUESTIONS 1-9: 0
1. LITTLE INTEREST OR PLEASURE IN DOING THINGS: 0
2. FEELING DOWN, DEPRESSED OR HOPELESS: 0
SUM OF ALL RESPONSES TO PHQ QUESTIONS 1-9: 0
SUM OF ALL RESPONSES TO PHQ QUESTIONS 1-9: 0

## 2022-05-11 NOTE — PROGRESS NOTES
[address] [address] [address]  Dept: [phone]  Dept Fax: [phone]    History and Physical  Patient:  Cheri Mccartney  YOB: 1960  Date of Service:  2022    Subjective:   Cheri Mccartney (:  1960) is a 62 y.o. female, Established patient, here for evaluation of the following chief complaint(s):    Chief Complaint   Patient presents with    Arm Pain     c/o right arm had rotator cuff surgery 10 yrs ago says has a bump on arm since having surgery says can feel a pulling sensation, lump is around bicep of right arm is hard was told by dermatologist was some calcification build up hard to lay on that side at night       Has a soft tissue mass to the RUE. States it has been there for 10 years. She has recently noticed a pulling sensation with it and thibks it is getting larger. She feels it more at rest. It bothers her to sleep on the right side. Arm Pain   Incident onset: 10 years ago. There was no injury mechanism. The pain is present in the right shoulder and upper right arm. The quality of the pain is described as aching. The pain does not radiate. The pain is mild. The pain has been intermittent since the incident. Pertinent negatives include no chest pain, muscle weakness, numbness or tingling. Exacerbated by: sleepin on her right side. She has tried nothing for the symptoms.        The 10-year ASCVD risk score (Romana Wolfe, et al., 2013) is: 2.5%    Values used to calculate the score:      Age: 62 years      Sex: Female      Is Non- : No      Diabetic: No      Tobacco smoker: No      Systolic Blood Pressure: 047 mmHg      Is BP treated: No      HDL Cholesterol: 84 mg/dL      Total Cholesterol: 246 mg/dL     BP Readings from Last 3 Encounters:   22 118/76   21 132/80   21 100/70      Pulse Readings from Last 3 Encounters:   22 77   21 58   21 93 Wt Readings from Last 3 Encounters:   22 140 lb (63.5 kg)   21 144 lb (65.3 kg)   21 138 lb (62.6 kg)        Allergies   Allergen Reactions    Sulfa Antibiotics      Cream gives a rash       Current Outpatient Medications   Medication Sig Dispense Refill    meloxicam (MOBIC) 15 MG tablet take 1 tablet by mouth once daily 30 tablet 2    DULoxetine (CYMBALTA) 30 MG extended release capsule TAKE 1 CAPSULE BY MOUTH DAILY 30 capsule 5    estradiol (ESTRACE) 0.1 MG/GM vaginal cream insert 1 gram vaginally once daily for 2 weeks 42.5 g 0    fluticasone (FLONASE) 50 MCG/ACT nasal spray instill 2 spray into each nostril once daily 16 g 3    hyoscyamine (LEVSIN/SL) 125 MCG sublingual tablet Place 1 tablet under the tongue every 4 hours as needed for Cramping 90 tablet 3    diclofenac sodium (VOLTAREN) 1 % GEL Apply 4 g topically 4 times daily 4 Tube 1    cetirizine (ZYRTEC ALLERGY) 10 MG tablet Take 1 tablet by mouth daily 30 tablet 5    diclofenac 1.5 % SOLN   0    Multiple Vitamins-Minerals (BEROCCA PO) Take by mouth daily      omeprazole (PRILOSEC) 40 MG delayed release capsule Take 40 mg by mouth daily      Probiotic Product (PROBIOTIC DAILY PO) Take 1 tablet by mouth      hyoscyamine (HYOMAX-SL) 125 MCG sublingual tablet Place 125 mcg under the tongue every 4 hours as needed for Cramping       No current facility-administered medications for this visit. Past Medical History:   Diagnosis Date    Allergic rhinitis        Past Surgical History:   Procedure Laterality Date    BACK SURGERY  2017    ablation     SECTION      CHOLECYSTECTOMY      COLONOSCOPY  2011    COLONOSCOPY  2014    mild diverticulosis Dr Terri Jones  2012    TUBAL LIGATION      UPPER GASTROINTESTINAL ENDOSCOPY  2011     No family history on file.   Social History     Tobacco Use    Smoking status: Never Smoker    Smokeless tobacco: Never Used   Vaping Use    Vaping Use: Never used   Substance Use Topics    Alcohol use: No    Drug use: No       Review of Systems:     Review of Systems   Constitutional: Negative for appetite change, chills, fatigue and fever. Cardiovascular: Negative for chest pain. Musculoskeletal: Positive for myalgias (right upper arm). Neurological: Negative for tingling and numbness. PHQ Scores 5/11/2022 2/17/2021 9/4/2020 4/24/2019 11/14/2018 10/4/2017   PHQ2 Score 0 0 0 0 0 0   PHQ9 Score 0 0 0 0 0 0     Interpretation of Total Score Depression Severity: 1-4 = Minimal depression, 5-9 = Mild depression, 10-14 = Moderate depression, 15-19 = Moderately severe depression, 20-27 = Severe depression     Physical Exam:     Vitals:    05/11/22 0956   BP: 118/76   Pulse: 77   SpO2: 97%   Weight: 140 lb (63.5 kg)   Height: 5' 2.75\" (1.594 m)      Body mass index is 25 kg/m². Physical Exam  Constitutional:       Appearance: Normal appearance. HENT:      Head: Normocephalic. Eyes:      Conjunctiva/sclera: Conjunctivae normal.   Cardiovascular:      Rate and Rhythm: Normal rate and regular rhythm. Heart sounds: Normal heart sounds. Pulmonary:      Effort: Pulmonary effort is normal.      Breath sounds: Normal breath sounds. No wheezing. Musculoskeletal:        Arms:       Cervical back: Neck supple. Neurological:      Mental Status: She is alert and oriented to person, place, and time. Psychiatric:         Mood and Affect: Mood normal.         Assessment/Plan:   1. Mass of soft tissue of right upper extremity  -     US EXTREMITY RIGHT NON VASC LIMITED; Future     Will obtain ultrasound to confirm suspect lipoma. Patient may wish to have this surgically removed. All patient questions answered. Patient voiced understanding. Instructed to continue current medications. Patient agreed with treatment plan. Follow up as directed. Return if symptoms worsen or fail to improve.     Please note that this chart was generated using voice recognition Dragon dictation software. Although every effort was made to ensure the accuracy of this automated transcription, some errors in transcription may have occurred.     Electronically signed by GONZALEZ Emery CNP on 5/21/2022

## 2022-05-23 DIAGNOSIS — S46.912A SHOULDER STRAIN, LEFT, INITIAL ENCOUNTER: ICD-10-CM

## 2022-05-23 RX ORDER — MELOXICAM 15 MG/1
TABLET ORAL
Qty: 30 TABLET | Refills: 2 | Status: SHIPPED | OUTPATIENT
Start: 2022-05-23 | End: 2022-08-24

## 2022-05-23 NOTE — TELEPHONE ENCOUNTER
Pura Cee is calling to request a refill on the following medication(s):  Requested Prescriptions     Pending Prescriptions Disp Refills    meloxicam (MOBIC) 15 MG tablet [Pharmacy Med Name: MELOXICAM 15 MG TABLET] 30 tablet 2     Sig: take 1 tablet by mouth once daily       Last Visit Date (If Applicable):  2/31/3839    Next Visit Date:    Visit date not found

## 2022-08-22 DIAGNOSIS — M19.90 ARTHRITIS: ICD-10-CM

## 2022-08-22 RX ORDER — DULOXETIN HYDROCHLORIDE 30 MG/1
CAPSULE, DELAYED RELEASE ORAL
Qty: 30 CAPSULE | Refills: 5 | Status: SHIPPED | OUTPATIENT
Start: 2022-08-22

## 2022-08-22 NOTE — TELEPHONE ENCOUNTER
Silvina Fox is calling to request a refill on the following medication(s):  Requested Prescriptions     Pending Prescriptions Disp Refills    DULoxetine (CYMBALTA) 30 MG extended release capsule [Pharmacy Med Name: DULOXETINE HCL DR 30 MG CAP] 30 capsule 5     Sig: take 1 capsule by mouth once daily       Last Visit Date (If Applicable):  3/87/3796    Next Visit Date:    Visit date not found

## 2022-08-23 DIAGNOSIS — S46.912A SHOULDER STRAIN, LEFT, INITIAL ENCOUNTER: ICD-10-CM

## 2022-08-24 RX ORDER — MELOXICAM 15 MG/1
TABLET ORAL
Qty: 30 TABLET | Refills: 2 | Status: SHIPPED | OUTPATIENT
Start: 2022-08-24

## 2022-08-29 ENCOUNTER — TELEPHONE (OUTPATIENT)
Dept: FAMILY MEDICINE CLINIC | Age: 62
End: 2022-08-29

## 2022-08-29 DIAGNOSIS — K21.9 GASTROESOPHAGEAL REFLUX DISEASE, UNSPECIFIED WHETHER ESOPHAGITIS PRESENT: Primary | ICD-10-CM

## 2022-08-29 RX ORDER — PANTOPRAZOLE SODIUM 40 MG/1
40 TABLET, DELAYED RELEASE ORAL
Qty: 60 TABLET | Refills: 2 | Status: SHIPPED | OUTPATIENT
Start: 2022-08-29

## 2022-08-29 NOTE — TELEPHONE ENCOUNTER
Pt called in to report her GERD is getting worse especially at night when she lays down. Stated at previous visit this was discussed. Pt takes 40 mg OTC Prilosec and would like to have something called in. Did not know if needs to schedule a follow up to discuss again or if medication could be called in.  Please advise

## 2022-08-29 NOTE — TELEPHONE ENCOUNTER
Please notify patient Protonix sent to the pharmacy. She will take 1 tablet bid, 30 minutes before a meal and discontinue the Prilosec. Follow up in the office if not improving.

## 2022-09-20 ENCOUNTER — TELEPHONE (OUTPATIENT)
Dept: FAMILY MEDICINE CLINIC | Age: 62
End: 2022-09-20

## 2022-09-20 DIAGNOSIS — Z12.31 ENCOUNTER FOR SCREENING MAMMOGRAM FOR BREAST CANCER: Primary | ICD-10-CM

## 2022-09-22 DIAGNOSIS — Z12.31 ENCOUNTER FOR SCREENING MAMMOGRAM FOR BREAST CANCER: ICD-10-CM

## 2022-09-23 NOTE — RESULT ENCOUNTER NOTE
Your recent mammogram result is normal.  Please call the office or message through [phone] with any additional questions or concerns.

## 2022-10-19 ENCOUNTER — OFFICE VISIT (OUTPATIENT)
Dept: FAMILY MEDICINE CLINIC | Age: 62
End: 2022-10-19
Payer: COMMERCIAL

## 2022-10-19 VITALS — HEART RATE: 87 BPM | SYSTOLIC BLOOD PRESSURE: 120 MMHG | DIASTOLIC BLOOD PRESSURE: 84 MMHG | OXYGEN SATURATION: 98 %

## 2022-10-19 DIAGNOSIS — N63.10 MASS OF RIGHT BREAST, UNSPECIFIED QUADRANT: Primary | ICD-10-CM

## 2022-10-19 PROCEDURE — 99213 OFFICE O/P EST LOW 20 MIN: CPT | Performed by: NURSE PRACTITIONER

## 2022-10-29 NOTE — PROGRESS NOTES
[address] [place] [address] [address]  Dept: [phone]  Dept Fax: [phone]    History and Physical  Patient:  Saniya Villegas  YOB: 1960  Date of Service:  10/19/2022    Assessment/Plan:   1. Mass of right breast, unspecified quadrant  -     US BREAST COMPLETE RIGHT; Future    All patient questions answered. Patient voiced understanding. Instructed to continue current medications. Patient agreed with treatment plan. Follow up as directed. Return if symptoms worsen or fail to improve.     Subjective:   Saniya Villegas (:  1960) is a 65 y.o. female, Established patient, here for evaluation of the following chief complaint(s):    Chief Complaint   Patient presents with    Breast Mass     Had mammogram on 22 and was normal, but has a lump on right outer breast    Gastroesophageal Reflux     Says started new med for reflux but states when sits or lays down will get a pain on right side under collar bone      Breast Problem  Chronicity:  New  Associated Symptoms: breast mass    Onset:  1 to 4 weeks ago  Progression since onset:  Worsening (getting larger)  Currently pregnant:  No  Current birth control:  None  Practices self breast exam: yes  Risk factors: no family history of breast cancer, no family history of ovarian cancer, no genetic predisposition, no obesity and not smoking    Diagnostic workup:  Mammogram  Treatments tried:  Observation      The 10-year ASCVD risk score (Shaina CARUSO, et al., 2019) is: 2.9%    Values used to calculate the score:      Age: 65 years      Sex: Female      Is Non- : No      Diabetic: No      Tobacco smoker: No      Systolic Blood Pressure: 873 mmHg      Is BP treated: No      HDL Cholesterol: 84 mg/dL      Total Cholesterol: 246 mg/dL     BP Readings from Last 3 Encounters:   10/19/22 120/84   22 118/76   21 132/80      Pulse Readings from Last 3 Encounters:   10/19/22 87   22 77   21 58      Wt Readings from Last 3 Encounters:   22 140 lb (63.5 kg)   21 144 lb (65.3 kg)   21 138 lb (62.6 kg)        Allergies   Allergen Reactions    Sulfa Antibiotics      Cream gives a rash       Current Outpatient Medications   Medication Sig Dispense Refill    pantoprazole (PROTONIX) 40 MG tablet Take 1 tablet by mouth 2 times daily (before meals) 60 tablet 2    meloxicam (MOBIC) 15 MG tablet take 1 tablet by mouth once daily 30 tablet 2    DULoxetine (CYMBALTA) 30 MG extended release capsule take 1 capsule by mouth once daily 30 capsule 5    estradiol (ESTRACE) 0.1 MG/GM vaginal cream insert 1 gram vaginally once daily for 2 weeks 42.5 g 0    fluticasone (FLONASE) 50 MCG/ACT nasal spray instill 2 spray into each nostril once daily 16 g 3    hyoscyamine (LEVSIN/SL) 125 MCG sublingual tablet Place 1 tablet under the tongue every 4 hours as needed for Cramping 90 tablet 3    diclofenac sodium (VOLTAREN) 1 % GEL Apply 4 g topically 4 times daily 4 Tube 1    cetirizine (ZYRTEC ALLERGY) 10 MG tablet Take 1 tablet by mouth daily 30 tablet 5    diclofenac 1.5 % SOLN   0    Multiple Vitamins-Minerals (BEROCCA PO) Take by mouth daily      Probiotic Product (PROBIOTIC DAILY PO) Take 1 tablet by mouth      hyoscyamine (LEVSIN/SL) 125 MCG sublingual tablet Place 125 mcg under the tongue every 4 hours as needed for Cramping       No current facility-administered medications for this visit. Past Medical History:   Diagnosis Date    Allergic rhinitis        Past Surgical History:   Procedure Laterality Date    BACK SURGERY  2017    ablation     SECTION      CHOLECYSTECTOMY      COLONOSCOPY  2011    COLONOSCOPY  2014    mild diverticulosis Dr Leta Brandon  2012    TUBAL LIGATION      UPPER GASTROINTESTINAL ENDOSCOPY  2011     No family history on file.   Social History     Tobacco Use Smoking status: Never    Smokeless tobacco: Never   Vaping Use    Vaping Use: Never used   Substance Use Topics    Alcohol use: No    Drug use: No       Review of Systems:     Review of Systems   Cardiovascular:  Negative for chest pain (lump to right breast). PHQ Scores 5/11/2022 2/17/2021 9/4/2020 4/24/2019 11/14/2018 10/4/2017   PHQ2 Score 0 0 0 0 0 0   PHQ9 Score 0 0 0 0 0 0     Interpretation of Total Score Depression Severity: 1-4 = Minimal depression, 5-9 = Mild depression, 10-14 = Moderate depression, 15-19 = Moderately severe depression, 20-27 = Severe depression     Physical Exam:     Vitals:    10/19/22 1407   BP: 120/84   Pulse: 87   SpO2: 98%      There is no height or weight on file to calculate BMI. Physical Exam  Constitutional:       Appearance: Normal appearance. HENT:      Head: Normocephalic. Eyes:      Conjunctiva/sclera: Conjunctivae normal.   Cardiovascular:      Rate and Rhythm: Normal rate and regular rhythm. Heart sounds: Normal heart sounds. Chest:      Chest wall: Mass (well defined moveable soft mass to right breast) present. No tenderness. Musculoskeletal:      Cervical back: Neck supple. Neurological:      Mental Status: She is alert and oriented to person, place, and time. Gait: Gait normal.   Psychiatric:         Mood and Affect: Mood normal.       Please note that this chart was generated using voice recognition Dragon dictation software. Although every effort was made to ensure the accuracy of this automated transcription, some errors in transcription may have occurred.     Electronically signed by GONZALEZ Carcamo CNP on 10/29/2022

## 2022-11-02 ENCOUNTER — PATIENT MESSAGE (OUTPATIENT)
Dept: FAMILY MEDICINE CLINIC | Age: 62
End: 2022-11-02

## 2022-11-02 DIAGNOSIS — R10.9 ABDOMINAL CRAMPING: ICD-10-CM

## 2022-11-07 DIAGNOSIS — R10.9 ABDOMINAL CRAMPING: ICD-10-CM

## 2022-11-07 NOTE — TELEPHONE ENCOUNTER
Kiersten Sparks is requesting a refill on the following medication(s):  Requested Prescriptions     Pending Prescriptions Disp Refills    hyoscyamine (LEVSIN/SL) 125 MCG sublingual tablet [Pharmacy Med Name: HYOSCYAMINE 0.125 MG TAB SL] 30 tablet 0     Sig: dissolve 1 tablet under the tongue every 4 hours if needed for cramping       Last Visit Date (If Applicable):  65/40/1485    Next Visit Date:    Visit date not found

## 2022-11-17 DIAGNOSIS — K21.9 GASTROESOPHAGEAL REFLUX DISEASE, UNSPECIFIED WHETHER ESOPHAGITIS PRESENT: ICD-10-CM

## 2022-11-17 RX ORDER — PANTOPRAZOLE SODIUM 40 MG/1
TABLET, DELAYED RELEASE ORAL
Qty: 60 TABLET | Refills: 5 | Status: SHIPPED | OUTPATIENT
Start: 2022-11-17

## 2022-11-17 NOTE — TELEPHONE ENCOUNTER
Yoon Kent is calling to request a refill on the following medication(s):  Requested Prescriptions     Pending Prescriptions Disp Refills    pantoprazole (PROTONIX) 40 MG tablet [Pharmacy Med Name: PANTOPRAZOLE SOD DR 40 MG TAB] 60 tablet 2     Sig: take 1 tablet by mouth twice a day before meals       Last Visit Date (If Applicable):  78/20/0727    Next Visit Date:    Visit date not found

## 2022-11-30 DIAGNOSIS — N95.2 ATROPHIC VAGINITIS: ICD-10-CM

## 2022-12-01 NOTE — TELEPHONE ENCOUNTER
Elsie Shetty is calling to request a refill on the following medication(s):  Requested Prescriptions     Pending Prescriptions Disp Refills    estradiol (ESTRACE) 0.1 MG/GM vaginal cream [Pharmacy Med Name: ESTRADIOL 0.01% CREAM] 42.5 g 0     Sig: insert 1 gram vaginally once daily for 2 weeks       Last Visit Date (If Applicable):  58/66/5602    Next Visit Date:    Visit date not found

## 2022-12-02 RX ORDER — ESTRADIOL 0.1 MG/G
CREAM VAGINAL
Qty: 42.5 G | Refills: 3 | Status: SHIPPED | OUTPATIENT
Start: 2022-12-02

## 2022-12-14 DIAGNOSIS — S46.912A SHOULDER STRAIN, LEFT, INITIAL ENCOUNTER: ICD-10-CM

## 2022-12-14 NOTE — TELEPHONE ENCOUNTER
Ayah Cali is calling to request a refill on the following medication(s):  Requested Prescriptions     Pending Prescriptions Disp Refills    meloxicam (MOBIC) 15 MG tablet [Pharmacy Med Name: MELOXICAM 15 MG TABLET] 90 tablet      Sig: take 1 tablet by mouth once daily       Last Visit Date (If Applicable):  73/87/6899    Next Visit Date:    Visit date not found

## 2022-12-16 RX ORDER — MELOXICAM 15 MG/1
TABLET ORAL
Qty: 90 TABLET | Refills: 1 | Status: SHIPPED | OUTPATIENT
Start: 2022-12-16

## 2023-01-22 ENCOUNTER — PATIENT MESSAGE (OUTPATIENT)
Dept: FAMILY MEDICINE CLINIC | Age: 63
End: 2023-01-22

## 2023-01-22 DIAGNOSIS — L08.9 INFECTED SEBACEOUS CYST OF SKIN: Primary | ICD-10-CM

## 2023-01-22 DIAGNOSIS — L72.3 INFECTED SEBACEOUS CYST OF SKIN: Primary | ICD-10-CM

## 2023-01-23 ENCOUNTER — TELEPHONE (OUTPATIENT)
Dept: FAMILY MEDICINE CLINIC | Age: 63
End: 2023-01-23

## 2023-01-23 RX ORDER — AMOXICILLIN AND CLAVULANATE POTASSIUM 875; 125 MG/1; MG/1
1 TABLET, FILM COATED ORAL 2 TIMES DAILY
Qty: 20 TABLET | Refills: 0 | Status: SHIPPED | OUTPATIENT
Start: 2023-01-23 | End: 2023-02-02

## 2023-01-23 NOTE — TELEPHONE ENCOUNTER
Called patient, states she noticed increased swelling, tenderness and redness to the area on Friday. She denied fever, or chills. She is scheduled for an evaluation in 3 days. Instructed to start Augmentin today due to likely infection. Drink plenty of water. Patient verbalized understanding.

## 2023-01-23 NOTE — TELEPHONE ENCOUNTER
From: Juice Driver  To: Brendan Peralta  Sent: 1/22/2023 5:16 PM EST  Subject: Breast Cyst    My Cyst on my right breast has changed. Its tender to the touch and also is red all the way around the cyst.  I thought You told me to let you know if there was any changes. I'm concerned.   Juice Driver

## 2023-01-31 ENCOUNTER — TELEPHONE (OUTPATIENT)
Dept: FAMILY MEDICINE CLINIC | Age: 63
End: 2023-01-31

## 2023-01-31 NOTE — TELEPHONE ENCOUNTER
Cyst is still there. Rock hard. Will be finished with the antbx tomorrow. Has improved some, but not completely. Is able to wear a bra now. Uses [facility]. Also, getting yeast infection from the antbx if you can prescribe something for that. Should she get more antbx? Do you want to see her? Leaves for [place] middle of February.

## 2023-02-01 DIAGNOSIS — N76.0 ACUTE VAGINITIS: Primary | ICD-10-CM

## 2023-02-01 RX ORDER — FLUCONAZOLE 100 MG/1
100 TABLET ORAL DAILY
Qty: 3 TABLET | Refills: 0 | Status: SHIPPED | OUTPATIENT
Start: 2023-02-01 | End: 2023-02-04

## 2023-02-02 NOTE — TELEPHONE ENCOUNTER
Please notify patient to complete the antibiotic and follow up if symptoms worsen, or fail to improve. Diflucan sent to the pharmacy to treat the yeast infection.

## 2023-02-27 DIAGNOSIS — M19.90 ARTHRITIS: ICD-10-CM

## 2023-02-27 RX ORDER — DULOXETIN HYDROCHLORIDE 30 MG/1
CAPSULE, DELAYED RELEASE ORAL
Qty: 30 CAPSULE | Refills: 5 | Status: SHIPPED | OUTPATIENT
Start: 2023-02-27

## 2023-02-27 NOTE — TELEPHONE ENCOUNTER
Ronni Koyanagi is calling to request a refill on the following medication(s):  Requested Prescriptions     Pending Prescriptions Disp Refills    DULoxetine (CYMBALTA) 30 MG extended release capsule [Pharmacy Med Name: DULOXETINE HCL DR 30 MG CAP] 30 capsule 5     Sig: take 1 capsule by mouth once daily       Last Visit Date (If Applicable):  96/40/4106    Next Visit Date:    Visit date not found

## 2023-03-29 DIAGNOSIS — H65.03 BILATERAL ACUTE SEROUS OTITIS MEDIA, RECURRENCE NOT SPECIFIED: ICD-10-CM

## 2023-03-29 RX ORDER — FLUTICASONE PROPIONATE 50 MCG
SPRAY, SUSPENSION (ML) NASAL
Qty: 16 G | Refills: 3 | Status: SHIPPED | OUTPATIENT
Start: 2023-03-29

## 2023-03-29 NOTE — TELEPHONE ENCOUNTER
Morro Anderson is calling to request a refill on the following medication(s):  Requested Prescriptions     Pending Prescriptions Disp Refills    fluticasone (FLONASE) 50 MCG/ACT nasal spray [Pharmacy Med Name: FLUTICASONE PROP 50 MCG SPRAY] 16 g 3     Sig: instill 2 spray into each nostril once daily       Last Visit Date (If Applicable):  97/92/8768    Next Visit Date:    Visit date not found

## 2023-05-24 ENCOUNTER — OFFICE VISIT (OUTPATIENT)
Dept: FAMILY MEDICINE CLINIC | Age: 63
End: 2023-05-24
Payer: COMMERCIAL

## 2023-05-24 VITALS
BODY MASS INDEX: 26.07 KG/M2 | DIASTOLIC BLOOD PRESSURE: 84 MMHG | WEIGHT: 146 LBS | OXYGEN SATURATION: 97 % | SYSTOLIC BLOOD PRESSURE: 130 MMHG | HEART RATE: 66 BPM

## 2023-05-24 DIAGNOSIS — M19.031 ARTHRITIS OF BOTH WRISTS: ICD-10-CM

## 2023-05-24 DIAGNOSIS — N60.81 CYST, BREAST, SEBACEOUS, RIGHT: ICD-10-CM

## 2023-05-24 DIAGNOSIS — D17.21 LIPOMA OF RIGHT UPPER EXTREMITY: ICD-10-CM

## 2023-05-24 DIAGNOSIS — Z13.220 SCREENING FOR HYPERLIPIDEMIA: ICD-10-CM

## 2023-05-24 DIAGNOSIS — M19.032 ARTHRITIS OF BOTH WRISTS: ICD-10-CM

## 2023-05-24 DIAGNOSIS — K21.9 GASTROESOPHAGEAL REFLUX DISEASE, UNSPECIFIED WHETHER ESOPHAGITIS PRESENT: ICD-10-CM

## 2023-05-24 DIAGNOSIS — J30.2 SEASONAL ALLERGIC RHINITIS, UNSPECIFIED TRIGGER: ICD-10-CM

## 2023-05-24 DIAGNOSIS — Z00.00 ENCOUNTER FOR WELL ADULT EXAM WITHOUT ABNORMAL FINDINGS: Primary | ICD-10-CM

## 2023-05-24 PROCEDURE — 99396 PREV VISIT EST AGE 40-64: CPT | Performed by: NURSE PRACTITIONER

## 2023-05-25 LAB
ALBUMIN/GLOBULIN RATIO: 1.5 G/DL
ALBUMIN: 4.5 G/DL (ref 3.5–5)
ALP BLD-CCNC: 53 UNITS/L (ref 38–126)
ALT SERPL-CCNC: 21 UNITS/L (ref 4–35)
ANION GAP SERPL CALCULATED.3IONS-SCNC: 3.6 MMOL/L
AST SERPL-CCNC: 35 UNITS/L (ref 14–36)
BASOPHILS %: 1.63 (ref 0–3)
BASOPHILS ABSOLUTE: 0.08 (ref 0–0.3)
BILIRUB SERPL-MCNC: 0.8 MG/DL (ref 0.2–1.3)
BUN BLDV-MCNC: 18 MG/DL (ref 7–17)
CALCIUM SERPL-MCNC: 9.7 MG/DL (ref 8.4–10.2)
CHLORIDE BLD-SCNC: 105 MMOL/L (ref 98–120)
CHOLESTEROL/HDL RATIO: 2.78 RATIO (ref 0–4.5)
CHOLESTEROL: 245 MG/DL (ref 50–200)
CO2: 31 MMOL/L (ref 22–31)
CREAT SERPL-MCNC: 0.8 MG/DL (ref 0.5–1)
EOSINOPHILS %: 13.25 (ref 0–10)
EOSINOPHILS ABSOLUTE: 0.67 (ref 0–1.1)
GFR CALCULATED: > 60
GLOBULIN: 3 G/DL
GLUCOSE: 81 MG/DL (ref 65–105)
HCT VFR BLD CALC: 43.1 % (ref 37–47)
HDLC SERPL-MCNC: 88 MG/DL (ref 36–68)
HEMOGLOBIN: 14.1 (ref 12–16)
LDL CHOLESTEROL CALCULATED: 141.2 MG/DL (ref 0–160)
LYMPHOCYTE %: 33.44 (ref 20–51.1)
LYMPHOCYTES ABSOLUTE: 1.69 (ref 1–5.5)
MCH RBC QN AUTO: 30.5 PG (ref 28.5–32.5)
MCHC RBC AUTO-ENTMCNC: 32.8 G/DL (ref 32–37)
MCV RBC AUTO: 93.2 FL (ref 80–94)
MONOCYTES %: 7.93 (ref 1.7–9.3)
MONOCYTES ABSOLUTE: 0.4 (ref 0.1–1)
NEUTROPHILS %: 43.75 (ref 42.2–75.2)
NEUTROPHILS ABSOLUTE: 2.21 (ref 2–8.1)
PDW BLD-RTO: 12 % (ref 8.5–15.5)
PLATELET # BLD: 243.9 THOU/MM3 (ref 130–400)
POTASSIUM SERPL-SCNC: 4.6 MMOL/L (ref 3.6–5)
RBC: 4.62 M/UL (ref 4.2–5.4)
SODIUM BLD-SCNC: 140 MMOL/L (ref 135–145)
TOTAL PROTEIN, SERUM: 7.5 G/DL (ref 6.3–8.2)
TRIGL SERPL-MCNC: 79 MG/DL (ref 10–250)
TSH REFLEX FT4: 4.38 MIU/ML (ref 0.49–4.67)
VLDLC SERPL CALC-MCNC: 16 MG/DL (ref 0–50)
WBC: 5 THOU/ML3 (ref 4.8–10.8)

## 2023-06-01 DIAGNOSIS — S46.912A SHOULDER STRAIN, LEFT, INITIAL ENCOUNTER: ICD-10-CM

## 2023-06-01 NOTE — TELEPHONE ENCOUNTER
Eloy Sifuentes is requesting a refill on the following medication(s):  Requested Prescriptions     Pending Prescriptions Disp Refills    meloxicam (MOBIC) 15 MG tablet [Pharmacy Med Name: MELOXICAM 15 MG TABLET] 90 tablet 1     Sig: take 1 tablet by mouth once daily       Last Visit Date (If Applicable):  3/74/6984    Next Visit Date:    Visit date not found

## 2023-06-03 PROBLEM — R20.2 NUMBNESS AND TINGLING IN BOTH HANDS: Status: RESOLVED | Noted: 2017-10-03 | Resolved: 2023-06-03

## 2023-06-03 PROBLEM — K59.00 CONSTIPATION: Status: RESOLVED | Noted: 2017-10-03 | Resolved: 2023-06-03

## 2023-06-03 PROBLEM — L98.9 SKIN LESION: Status: RESOLVED | Noted: 2017-10-03 | Resolved: 2023-06-03

## 2023-06-03 PROBLEM — N60.81: Status: ACTIVE | Noted: 2023-06-03

## 2023-06-03 PROBLEM — R42 VERTIGO: Status: RESOLVED | Noted: 2017-10-03 | Resolved: 2023-06-03

## 2023-06-03 PROBLEM — R20.0 NUMBNESS AND TINGLING IN BOTH HANDS: Status: RESOLVED | Noted: 2017-10-03 | Resolved: 2023-06-03

## 2023-06-03 PROBLEM — D17.21 LIPOMA OF RIGHT UPPER EXTREMITY: Status: ACTIVE | Noted: 2023-06-03

## 2023-06-03 RX ORDER — MELOXICAM 15 MG/1
TABLET ORAL
Qty: 90 TABLET | Refills: 1 | Status: SHIPPED | OUTPATIENT
Start: 2023-06-03

## 2023-06-03 ASSESSMENT — ENCOUNTER SYMPTOMS
DIARRHEA: 0
ABDOMINAL PAIN: 0
EYES NEGATIVE: 1
NAUSEA: 0
WHEEZING: 0
CONSTIPATION: 0
COUGH: 0
COLOR CHANGE: 1
SHORTNESS OF BREATH: 0

## 2023-06-19 DIAGNOSIS — K21.9 GASTROESOPHAGEAL REFLUX DISEASE, UNSPECIFIED WHETHER ESOPHAGITIS PRESENT: ICD-10-CM

## 2023-07-23 DIAGNOSIS — K21.9 GASTROESOPHAGEAL REFLUX DISEASE, UNSPECIFIED WHETHER ESOPHAGITIS PRESENT: ICD-10-CM

## 2023-07-24 RX ORDER — PANTOPRAZOLE SODIUM 40 MG/1
TABLET, DELAYED RELEASE ORAL
Qty: 60 TABLET | Refills: 5 | Status: SHIPPED | OUTPATIENT
Start: 2023-07-24

## 2023-07-24 NOTE — TELEPHONE ENCOUNTER
Sandy Marshall is calling to request a refill on the following medication(s):  Requested Prescriptions     Pending Prescriptions Disp Refills    pantoprazole (PROTONIX) 40 MG tablet [Pharmacy Med Name: PANTOPRAZOLE SOD DR 40 MG TAB] 60 tablet 5     Sig: take 1 tablet by mouth twice a day before meals       Last Visit Date (If Applicable):  2/28/4807    Next Visit Date:    Visit date not found

## 2023-09-21 DIAGNOSIS — M19.90 ARTHRITIS: ICD-10-CM

## 2023-09-21 NOTE — TELEPHONE ENCOUNTER
Uriel Yung is calling to request a refill on the following medication(s):  Requested Prescriptions     Pending Prescriptions Disp Refills    DULoxetine (CYMBALTA) 30 MG extended release capsule [Pharmacy Med Name: DULOXETINE HCL DR 30 MG CAP] 30 capsule 5     Sig: take 1 capsule by mouth once daily       Last Visit Date (If Applicable):  4/23/9775    Next Visit Date:    Visit date not found

## 2023-09-22 RX ORDER — DULOXETIN HYDROCHLORIDE 30 MG/1
CAPSULE, DELAYED RELEASE ORAL
Qty: 30 CAPSULE | Refills: 5 | Status: SHIPPED | OUTPATIENT
Start: 2023-09-22

## 2023-09-28 ENCOUNTER — TELEPHONE (OUTPATIENT)
Dept: FAMILY MEDICINE CLINIC | Age: 63
End: 2023-09-28

## 2023-09-28 DIAGNOSIS — Z12.31 SCREENING MAMMOGRAM FOR BREAST CANCER: Primary | ICD-10-CM

## 2023-11-15 ENCOUNTER — OFFICE VISIT (OUTPATIENT)
Dept: FAMILY MEDICINE CLINIC | Age: 63
End: 2023-11-15
Payer: COMMERCIAL

## 2023-11-15 VITALS
WEIGHT: 141 LBS | BODY MASS INDEX: 25.18 KG/M2 | SYSTOLIC BLOOD PRESSURE: 106 MMHG | DIASTOLIC BLOOD PRESSURE: 64 MMHG | OXYGEN SATURATION: 98 % | HEART RATE: 83 BPM

## 2023-11-15 DIAGNOSIS — R10.9 ABDOMINAL CRAMPING: ICD-10-CM

## 2023-11-15 DIAGNOSIS — M26.609 TMJ (TEMPOROMANDIBULAR JOINT DISORDER): Primary | ICD-10-CM

## 2023-11-15 DIAGNOSIS — H92.01 RIGHT EAR PAIN: ICD-10-CM

## 2023-11-15 PROCEDURE — 99214 OFFICE O/P EST MOD 30 MIN: CPT | Performed by: FAMILY MEDICINE

## 2023-11-15 RX ORDER — LORATADINE 10 MG/1
10 TABLET ORAL DAILY
COMMUNITY

## 2023-11-15 RX ORDER — PREDNISONE 20 MG/1
20 TABLET ORAL DAILY
Qty: 5 TABLET | Refills: 0 | Status: SHIPPED | OUTPATIENT
Start: 2023-11-15 | End: 2023-11-20

## 2023-11-15 NOTE — PROGRESS NOTES
[address] [facility]  [address] [facility], [address] [facility], [address]  Dept: [phone]  Dept [facility]:[phone]    Abdelrahman Ziegler is a 59 y.o. female who presents today for her medical conditions/complaints as notedbelow. Abdelrahman Ziegler is c/o of Otalgia (Pain in the right ear x 1 months. Yesterday the pain radiated down the right side of her jaw. Has come sinus tenderness on the right side. )    Assessment/Plan:     1. TMJ (temporomandibular joint disorder)  -     predniSONE (DELTASONE) 20 MG tablet; Take 1 tablet by mouth daily for 5 days, Disp-5 tablet, R-0Normal  2. Abdominal cramping  -     hyoscyamine (LEVSIN/SL) 125 MCG sublingual tablet; dissolve 1 tablet under the tongue every 4 hours if needed for cramping, Disp-30 tablet, R-0Normal  3. Right ear pain  -     predniSONE (DELTASONE) 20 MG tablet; Take 1 tablet by mouth daily for 5 days, Disp-5 tablet, R-0Normal    Voltaren gel as needed several times per day for symptom control. Trial of the prednisone as well. With food. Refill given for the hyoscyamine. Lab Results   Component Value Date    WBC 5.0 05/25/2023    HGB 14.1 05/25/2023    HCT 43.1 05/25/2023    .9 05/25/2023    CHOL 245 (H) 05/25/2023    TRIG 79 05/25/2023    HDL 88 (H) 05/25/2023    ALT 21 05/25/2023    AST 35 05/25/2023     05/25/2023    K 4.6 05/25/2023     05/25/2023    CREATININE 0.8 05/25/2023    BUN 18 (H) 05/25/2023    CO2 31 05/25/2023       No follow-ups on file. Subjective:      HPI:     HPI  Otalgia (Pain in the right ear x 1 months. Yesterday the pain radiated down the right side of her jaw. Has come sinus tenderness on the right side. ) Down the jawline just started yesterday but has it off and on in the ear. Usually feels it more at night. Last night had a headache in her eye-- felt like \"sinus\"-- was in the urgent care about a month ago for this.   She recommended the flonase sensimist.     BP Readings from

## 2023-11-16 DIAGNOSIS — R10.9 ABDOMINAL CRAMPING: ICD-10-CM

## 2023-12-28 DIAGNOSIS — S46.912A SHOULDER STRAIN, LEFT, INITIAL ENCOUNTER: ICD-10-CM

## 2023-12-29 NOTE — TELEPHONE ENCOUNTER
Beata Rashid is calling to request a refill on the following medication(s):  Requested Prescriptions     Pending Prescriptions Disp Refills    meloxicam (MOBIC) 15 MG tablet [Pharmacy Med Name: MELOXICAM 15 MG TABLET] 90 tablet 1     Sig: take 1 tablet by mouth once daily       Last Visit Date (If Applicable):  11/15/2023    Next Visit Date:    Visit date not found

## 2024-01-02 RX ORDER — MELOXICAM 15 MG/1
TABLET ORAL
Qty: 90 TABLET | Refills: 1 | Status: SHIPPED | OUTPATIENT
Start: 2024-01-02

## 2024-01-14 DIAGNOSIS — K21.9 GASTROESOPHAGEAL REFLUX DISEASE, UNSPECIFIED WHETHER ESOPHAGITIS PRESENT: ICD-10-CM

## 2024-01-15 NOTE — TELEPHONE ENCOUNTER
Beata Rashid is calling to request a refill on the following medication(s):  Requested Prescriptions     Pending Prescriptions Disp Refills    pantoprazole (PROTONIX) 40 MG tablet [Pharmacy Med Name: PANTOPRAZOLE SOD DR 40 MG TAB] 60 tablet 5     Sig: take 1 tablet by mouth twice a day before meals       Last Visit Date (If Applicable):  11/15/2023    Next Visit Date:    Visit date not found

## 2024-01-18 RX ORDER — PANTOPRAZOLE SODIUM 40 MG/1
TABLET, DELAYED RELEASE ORAL
Qty: 60 TABLET | Refills: 5 | Status: SHIPPED | OUTPATIENT
Start: 2024-01-18

## 2024-02-27 ENCOUNTER — OFFICE VISIT (OUTPATIENT)
Dept: FAMILY MEDICINE CLINIC | Age: 64
End: 2024-02-27
Payer: COMMERCIAL

## 2024-02-27 VITALS
SYSTOLIC BLOOD PRESSURE: 138 MMHG | DIASTOLIC BLOOD PRESSURE: 86 MMHG | OXYGEN SATURATION: 97 % | WEIGHT: 140.6 LBS | HEART RATE: 85 BPM | BODY MASS INDEX: 25.11 KG/M2

## 2024-02-27 DIAGNOSIS — J01.40 ACUTE NON-RECURRENT PANSINUSITIS: Primary | ICD-10-CM

## 2024-02-27 DIAGNOSIS — R68.89 FLU-LIKE SYMPTOMS: ICD-10-CM

## 2024-02-27 DIAGNOSIS — R05.1 ACUTE COUGH: ICD-10-CM

## 2024-02-27 LAB
INFLUENZA A ANTIGEN, POC: NEGATIVE
INFLUENZA B ANTIGEN, POC: NEGATIVE
LOT EXPIRE DATE: NORMAL
LOT KIT NUMBER: NORMAL
SARS-COV-2, POC: NORMAL
VALID INTERNAL CONTROL: NORMAL
VENDOR AND KIT NAME POC: NORMAL

## 2024-02-27 PROCEDURE — 87428 SARSCOV & INF VIR A&B AG IA: CPT | Performed by: NURSE PRACTITIONER

## 2024-02-27 PROCEDURE — 99213 OFFICE O/P EST LOW 20 MIN: CPT | Performed by: NURSE PRACTITIONER

## 2024-02-27 RX ORDER — AZITHROMYCIN 250 MG/1
TABLET, FILM COATED ORAL
Qty: 1 PACKET | Refills: 0 | Status: SHIPPED | OUTPATIENT
Start: 2024-02-27 | End: 2024-03-03

## 2024-02-27 RX ORDER — BENZONATATE 100 MG/1
100 CAPSULE ORAL 3 TIMES DAILY PRN
Qty: 30 CAPSULE | Refills: 1 | Status: SHIPPED | OUTPATIENT
Start: 2024-02-27 | End: 2024-03-18

## 2024-02-27 NOTE — PROGRESS NOTES
PM 11/14/2018     2:24 PM 10/4/2017     3:50 PM   PHQ Scores   PHQ2 Score 0 0 0 0 0 0 0   PHQ9 Score 0 0 0 0 0 0 0     Interpretation of Total Score Depression Severity: 1-4 = Minimal depression, 5-9 = Mild depression, 10-14 = Moderate depression, 15-19 = Moderately severe depression, 20-27 = Severe depression     Objective:     Vitals:    02/27/24 1315   BP: 138/86   Pulse: 85   SpO2: 97%   Weight: 63.8 kg (140 lb 9.6 oz)      Physical Exam  Constitutional:       Appearance: Normal appearance.   HENT:      Head: Normocephalic.      Right Ear: Tympanic membrane and ear canal normal.      Left Ear: Tympanic membrane and ear canal normal.      Nose: Mucosal edema present.      Right Turbinates: Swollen.      Left Turbinates: Swollen.      Right Sinus: Maxillary sinus tenderness and frontal sinus tenderness present.      Left Sinus: Maxillary sinus tenderness and frontal sinus tenderness present.      Mouth/Throat:      Pharynx: Posterior oropharyngeal erythema (mild) present.   Eyes:      Conjunctiva/sclera: Conjunctivae normal.   Cardiovascular:      Rate and Rhythm: Normal rate and regular rhythm.      Heart sounds: Normal heart sounds.   Pulmonary:      Effort: Pulmonary effort is normal.      Breath sounds: Normal breath sounds. No wheezing.   Musculoskeletal:      Cervical back: Neck supple.   Neurological:      Mental Status: She is alert and oriented to person, place, and time.       Please note that this chart was generated using voice recognition Dragon dictation software.  Although every effort was made to ensure the accuracy of this automated transcription, some errors in transcription may have occurred.    Electronically signed by GONZALEZ Varela CNP on 3/5/2024 at 8:16 PM.

## 2024-03-17 DIAGNOSIS — M19.90 ARTHRITIS: ICD-10-CM

## 2024-03-18 RX ORDER — DULOXETIN HYDROCHLORIDE 30 MG/1
CAPSULE, DELAYED RELEASE ORAL
Qty: 30 CAPSULE | Refills: 5 | Status: SHIPPED | OUTPATIENT
Start: 2024-03-18

## 2024-03-18 NOTE — TELEPHONE ENCOUNTER
Beata Rashid is requesting a refill on the following medication(s):  Requested Prescriptions     Pending Prescriptions Disp Refills    DULoxetine (CYMBALTA) 30 MG extended release capsule [Pharmacy Med Name: DULOXETINE HCL DR 30 MG CAP] 30 capsule 5     Sig: take 1 capsule by mouth once daily       Last Visit Date (If Applicable):  2/27/2024    Next Visit Date:    Visit date not found

## 2024-05-10 ENCOUNTER — TELEPHONE (OUTPATIENT)
Dept: FAMILY MEDICINE CLINIC | Age: 64
End: 2024-05-10

## 2024-05-14 ENCOUNTER — OFFICE VISIT (OUTPATIENT)
Dept: FAMILY MEDICINE CLINIC | Age: 64
End: 2024-05-14
Payer: COMMERCIAL

## 2024-05-14 VITALS
DIASTOLIC BLOOD PRESSURE: 82 MMHG | WEIGHT: 143 LBS | BODY MASS INDEX: 25.53 KG/M2 | SYSTOLIC BLOOD PRESSURE: 132 MMHG | OXYGEN SATURATION: 98 % | HEART RATE: 81 BPM

## 2024-05-14 DIAGNOSIS — K31.7 GASTRIC POLYP: ICD-10-CM

## 2024-05-14 DIAGNOSIS — D32.9 MENINGIOMA (HCC): ICD-10-CM

## 2024-05-14 DIAGNOSIS — K21.9 GASTROESOPHAGEAL REFLUX DISEASE, UNSPECIFIED WHETHER ESOPHAGITIS PRESENT: Primary | ICD-10-CM

## 2024-05-14 DIAGNOSIS — K29.70 GASTRITIS WITHOUT BLEEDING, UNSPECIFIED CHRONICITY, UNSPECIFIED GASTRITIS TYPE: ICD-10-CM

## 2024-05-14 PROCEDURE — 99214 OFFICE O/P EST MOD 30 MIN: CPT | Performed by: NURSE PRACTITIONER

## 2024-05-14 ASSESSMENT — PATIENT HEALTH QUESTIONNAIRE - PHQ9
SUM OF ALL RESPONSES TO PHQ QUESTIONS 1-9: 0
1. LITTLE INTEREST OR PLEASURE IN DOING THINGS: NOT AT ALL
SUM OF ALL RESPONSES TO PHQ QUESTIONS 1-9: 0
SUM OF ALL RESPONSES TO PHQ QUESTIONS 1-9: 0
2. FEELING DOWN, DEPRESSED OR HOPELESS: NOT AT ALL
SUM OF ALL RESPONSES TO PHQ9 QUESTIONS 1 & 2: 0
SUM OF ALL RESPONSES TO PHQ QUESTIONS 1-9: 0

## 2024-05-14 NOTE — PROGRESS NOTES
[address], [address]  [place], [place] [address]  Dept: [phone]  Dept Fax: [phone]    Date of Service:  5/14/2024    Beata Rashid is a 63 y.o. female who comes in today for follow up of chronic health issues.     Chief Complaint   Patient presents with    Follow-up     Was seen at Urgent Care chest pain, states had some pain in center of chest and took a tums but pain continued and was getting worse and took two more tums but then got some numbness in arm. Has been taking pantoprazole twice a day. Also reports had started a colon cleanse and then next day developed pain. Has stressed test ordered and f/u with cardiology    Results     Also had an incidental fiding of meningioma left temporal fossa        Diagnoses / Plan:   1. Gastroesophageal reflux disease, unspecified whether esophagitis present  -     External Referral To Gastroenterology  2. Gastritis without bleeding, unspecified chronicity, unspecified gastritis type  -     External Referral To Gastroenterology  3. Gastric polyp  -     External Referral To Gastroenterology  4. Meningioma (HCC)  -     MRI BRAIN W WO CONTRAST; Future     Review results from recent visit to [facility] ED. Referral placed to GI specialist. MRI placed for further evaluation of the meningioma. She has the referral to neurosurgery. Appointment is scheduled with cardiology on 7/23/2024. Instructed to continue the PPI. Reviewed potential triggering factors, and reduce stress.     The importance of regular physical activity was emphasized. Patient has demonstrated a clear understanding of the proposed plan of care, and all questions have been answered.       Return in about 3 months (around 8/14/2024).     Subjective:   History of Present Illness:  Presents for follow up after ED visit at [facility]. Patient reports having chest discomfort that radiated to the right arm, despite taking pantoprazole and attempting TUMS for relief. Also

## 2024-05-22 DIAGNOSIS — D32.9 MENINGIOMA (HCC): Primary | ICD-10-CM

## 2024-05-22 ASSESSMENT — ENCOUNTER SYMPTOMS
ABDOMINAL PAIN: 0
NAUSEA: 0
SHORTNESS OF BREATH: 0
CONSTIPATION: 0
DIARRHEA: 0
COUGH: 0
WHEEZING: 0

## 2024-05-28 ENCOUNTER — HOSPITAL ENCOUNTER (OUTPATIENT)
Dept: MRI IMAGING | Age: 64
Discharge: HOME | End: 2024-05-30
Payer: COMMERCIAL

## 2024-05-28 DIAGNOSIS — D32.9 MENINGIOMA (HCC): ICD-10-CM

## 2024-05-28 PROCEDURE — 70553 MRI BRAIN STEM W/O & W/DYE: CPT

## 2024-05-28 PROCEDURE — A9579 GAD-BASE MR CONTRAST NOS,1ML: HCPCS | Performed by: NURSE PRACTITIONER

## 2024-05-28 PROCEDURE — 6360000004 HC RX CONTRAST MEDICATION: Performed by: NURSE PRACTITIONER

## 2024-05-28 RX ADMIN — GADOTERIDOL 13 ML: 279.3 INJECTION, SOLUTION INTRAVENOUS at 14:32

## 2024-06-11 ENCOUNTER — OFFICE VISIT (OUTPATIENT)
Dept: FAMILY MEDICINE CLINIC | Age: 64
End: 2024-06-11
Payer: COMMERCIAL

## 2024-06-11 VITALS
HEART RATE: 63 BPM | HEIGHT: 62 IN | SYSTOLIC BLOOD PRESSURE: 112 MMHG | DIASTOLIC BLOOD PRESSURE: 70 MMHG | OXYGEN SATURATION: 97 % | WEIGHT: 145 LBS | BODY MASS INDEX: 26.68 KG/M2

## 2024-06-11 DIAGNOSIS — M77.8 DELTOID TENDONITIS OF LEFT SHOULDER: Primary | ICD-10-CM

## 2024-06-11 PROCEDURE — 99213 OFFICE O/P EST LOW 20 MIN: CPT | Performed by: FAMILY MEDICINE

## 2024-06-11 RX ORDER — LIDOCAINE 4 G/G
1 PATCH TOPICAL DAILY
Qty: 10 EACH | Refills: 0 | Status: SHIPPED | OUTPATIENT
Start: 2024-06-11 | End: 2024-06-21

## 2024-06-11 NOTE — PROGRESS NOTES
HPI:  Patient comes in today for   Chief Complaint   Patient presents with    Arm Pain     Pt is here for left shoulder pain. Pt states that she can not lift it up. Pt states she has had shoulder problems for awhile but not this bad   Here with c/o Pain in left arm was cleaning windows last night has had problems with her left shoulder with lifting weights and sometimes bother her has trouble raising the arm.No swelling in shoulder.Has h/o arthritis multiple sites is on mobic    REVIEW OF SYSTEMS:  Cardio: No chest pain, palpitations, BROOKS, edema, PND  Pulmonary: No cough, hemoptysis, SOB  GI: No nausea, vomiting, dysphagia, odynophagia, diarrhea,constipation  : No dysuria, hematuria, urgency, incontinence  Past Medical History:   Diagnosis Date    Allergic rhinitis     Constipation 10/3/2017    Numbness and tingling in both hands 10/3/2017    Vertigo 10/3/2017       Current Outpatient Medications   Medication Sig Dispense Refill    DULoxetine (CYMBALTA) 30 MG extended release capsule take 1 capsule by mouth once daily 30 capsule 5    pantoprazole (PROTONIX) 40 MG tablet take 1 tablet by mouth twice a day before meals 60 tablet 5    meloxicam (MOBIC) 15 MG tablet take 1 tablet by mouth once daily 90 tablet 1    loratadine (CLARITIN) 10 MG tablet Take 1 tablet by mouth daily      hyoscyamine (LEVSIN/SL) 125 MCG sublingual tablet dissolve 1 tablet under the tongue every 4 hours if needed for cramping 30 tablet 0    estradiol (ESTRACE) 0.1 MG/GM vaginal cream insert 1 gram vaginally once daily for 2 weeks 42.5 g 3    diclofenac sodium (VOLTAREN) 1 % GEL Apply 4 g topically 4 times daily 4 Tube 1    diclofenac 1.5 % SOLN   0    Multiple Vitamins-Minerals (BEROCCA PO) Take by mouth daily      fluticasone (FLONASE) 50 MCG/ACT nasal spray instill 2 spray into each nostril once daily (Patient not taking: Reported on 11/15/2023) 16 g 3     No current facility-administered medications for this visit.     Allergies   Allergen

## 2024-07-31 ENCOUNTER — OFFICE VISIT (OUTPATIENT)
Dept: FAMILY MEDICINE CLINIC | Age: 64
End: 2024-07-31
Payer: COMMERCIAL

## 2024-07-31 DIAGNOSIS — R21 RASH AND NONSPECIFIC SKIN ERUPTION: Primary | ICD-10-CM

## 2024-07-31 PROCEDURE — 99212 OFFICE O/P EST SF 10 MIN: CPT | Performed by: NURSE PRACTITIONER

## 2024-07-31 RX ORDER — FAMOTIDINE 20 MG/1
TABLET, FILM COATED ORAL
COMMUNITY
Start: 2024-07-23

## 2024-07-31 RX ORDER — PERMETHRIN 50 MG/G
CREAM TOPICAL
COMMUNITY
Start: 2024-07-21 | End: 2024-08-11 | Stop reason: ALTCHOICE

## 2024-07-31 NOTE — PROGRESS NOTES
[address], [address]  [place] [id number]  Dept: [phone]  Dept Fax: [phone]    Date of Service:  7/31/2024    Chief Complaint   Patient presents with    Rash        Diagnoses / Plan:   1. Rash and nonspecific skin eruption     Suspected no-see-ums bites. Advised patient to use insect repellent and avoid scented lotions. Monitor for any changes such as s/s infection.     Follow-up for worsening or persistent symptoms.  Patient verbalizes understanding regarding plan of care and all questions answered.    Return if symptoms worsen or fail to improve.     Subjective:   History of Present Illness:  Patient presents with concerns for a rash on the hips, suspected to be from no-see-ums bites, which has been treated with a steroid shot and scabies lotion after visiting urgent care a few days ago. Reports no relief with treatment. She would like to make sure nothing else is needed.     Current Outpatient Medications   Medication Sig Dispense Refill    famotidine (PEPCID) 20 MG tablet       meloxicam (MOBIC) 15 MG tablet Take 1 tablet by mouth daily 90 tablet 1    DULoxetine (CYMBALTA) 30 MG extended release capsule take 1 capsule by mouth once daily 30 capsule 5    pantoprazole (PROTONIX) 40 MG tablet take 1 tablet by mouth twice a day before meals 60 tablet 5    hyoscyamine (LEVSIN/SL) 125 MCG sublingual tablet dissolve 1 tablet under the tongue every 4 hours if needed for cramping 30 tablet 0    estradiol (ESTRACE) 0.1 MG/GM vaginal cream insert 1 gram vaginally once daily for 2 weeks 42.5 g 3    diclofenac sodium (VOLTAREN) 1 % GEL Apply 4 g topically 4 times daily 4 Tube 1    diclofenac 1.5 % SOLN   0    Multiple Vitamins-Minerals (BEROCCA PO) Take by mouth daily       No current facility-administered medications for this visit.     Review of Systems   Skin:  Positive for rash (bilateral hip).          5/14/2024     2:46 PM 4/12/2023     9:03 AM

## 2024-08-04 DIAGNOSIS — S46.912A SHOULDER STRAIN, LEFT, INITIAL ENCOUNTER: ICD-10-CM

## 2024-08-05 RX ORDER — MELOXICAM 15 MG/1
15 TABLET ORAL DAILY
Qty: 90 TABLET | Refills: 1 | Status: SHIPPED | OUTPATIENT
Start: 2024-08-05

## 2024-08-05 NOTE — TELEPHONE ENCOUNTER
Beata Rashid is calling to request a refill on the following medication(s):  Requested Prescriptions     Pending Prescriptions Disp Refills    meloxicam (MOBIC) 15 MG tablet 90 tablet 1     Sig: Take 1 tablet by mouth daily       Last Visit Date (If Applicable):  7/31/2024    Next Visit Date:    Visit date not found

## 2024-09-26 DIAGNOSIS — M19.90 ARTHRITIS: ICD-10-CM

## 2024-09-27 RX ORDER — DULOXETIN HYDROCHLORIDE 30 MG/1
CAPSULE, DELAYED RELEASE ORAL
Qty: 60 CAPSULE | Refills: 5 | Status: SHIPPED | OUTPATIENT
Start: 2024-09-27

## 2024-10-01 DIAGNOSIS — Z12.31 SCREENING MAMMOGRAM, ENCOUNTER FOR: Primary | ICD-10-CM

## 2024-10-08 LAB
CREAT SERPL-MCNC: 0.9 MG/DL (ref 0.5–1)
GFR, ESTIMATED: > 60

## 2024-12-05 ENCOUNTER — OFFICE VISIT (OUTPATIENT)
Dept: FAMILY MEDICINE CLINIC | Age: 64
End: 2024-12-05
Payer: COMMERCIAL

## 2024-12-05 VITALS
SYSTOLIC BLOOD PRESSURE: 116 MMHG | WEIGHT: 144.6 LBS | OXYGEN SATURATION: 99 % | BODY MASS INDEX: 26.45 KG/M2 | DIASTOLIC BLOOD PRESSURE: 68 MMHG | HEART RATE: 76 BPM

## 2024-12-05 DIAGNOSIS — J01.01 ACUTE RECURRENT MAXILLARY SINUSITIS: Primary | ICD-10-CM

## 2024-12-05 DIAGNOSIS — R05.2 SUBACUTE COUGH: ICD-10-CM

## 2024-12-05 PROCEDURE — 99213 OFFICE O/P EST LOW 20 MIN: CPT | Performed by: NURSE PRACTITIONER

## 2024-12-05 SDOH — ECONOMIC STABILITY: FOOD INSECURITY: WITHIN THE PAST 12 MONTHS, YOU WORRIED THAT YOUR FOOD WOULD RUN OUT BEFORE YOU GOT MONEY TO BUY MORE.: NEVER TRUE

## 2024-12-05 SDOH — ECONOMIC STABILITY: FOOD INSECURITY: WITHIN THE PAST 12 MONTHS, THE FOOD YOU BOUGHT JUST DIDN'T LAST AND YOU DIDN'T HAVE MONEY TO GET MORE.: NEVER TRUE

## 2024-12-05 SDOH — ECONOMIC STABILITY: INCOME INSECURITY: HOW HARD IS IT FOR YOU TO PAY FOR THE VERY BASICS LIKE FOOD, HOUSING, MEDICAL CARE, AND HEATING?: NOT HARD AT ALL

## 2024-12-05 NOTE — PROGRESS NOTES
[address], [address]  [place], [place] [address]  Dept: [phone]  Dept Fax: [phone]    Date of Service:  12/5/2024    Chief Complaint   Patient presents with    Cough     Cough since beginning of October, have been to urgent care and gotten a ZPAK but it still not feeling well. Is congested. Has taken mucinex this morning        Diagnoses / Plan:     Assessment & Plan  Acute recurrent maxillary sinusitis  - Clinical presentation consistent with bacterial sinusitis with failed previous antibiotic treatment  - Plan: Start Augmentin 875/125 mg BID for 10 days  - Switch from Claritin to either Zyrtec or Allegra  - Continue saline nasal rinses, humidifier  Orders:    amoxicillin-clavulanate (AUGMENTIN) 875-125 MG per tablet; Take 1 tablet by mouth 2 times daily for 10 days    Subacute cough  - Likely secondary to sinusitis  - Continue conservative measures including adequate hydration  - May use Mucinex sparingly, emphasizing importance of adequate fluid intake    Encouraged symptomatic treatment, rest, increase oral fluid intake.      Follow up if not improving within 2-3 days or worsening symptoms. May need sinus imaging if symptoms persist despite current treatment plan.    Subjective:   History of Present Illness:  Patient presents with persistent cough that started in October. Initially seen at urgent care where chest X-rays were normal, and was treated with azithromycin. Subsequently received 14 days of cephalexin following an arm procedure. Cough has persisted despite antibiotic treatments.    Reports recent episode of laryngitis over the weekend. Current symptoms include inability to breathe through nose, thick green-yellow nasal discharge, facial pressure (particularly around eyes), and persistent cough that worsens at night. Cough is non-productive but feels like mucus is stuck in throat. Denies fever, chills, or shortness of breath.    Reports

## 2024-12-16 ENCOUNTER — PATIENT MESSAGE (OUTPATIENT)
Dept: FAMILY MEDICINE CLINIC | Age: 64
End: 2024-12-16

## 2024-12-16 DIAGNOSIS — N76.0 ACUTE VAGINITIS: Primary | ICD-10-CM

## 2024-12-16 RX ORDER — FLUCONAZOLE 150 MG/1
150 TABLET ORAL
Qty: 2 TABLET | Refills: 0 | Status: SHIPPED | OUTPATIENT
Start: 2024-12-16 | End: 2024-12-22

## 2025-01-14 ENCOUNTER — OFFICE VISIT (OUTPATIENT)
Dept: FAMILY MEDICINE CLINIC | Age: 65
End: 2025-01-14

## 2025-01-14 VITALS
DIASTOLIC BLOOD PRESSURE: 60 MMHG | WEIGHT: 146 LBS | BODY MASS INDEX: 26.7 KG/M2 | SYSTOLIC BLOOD PRESSURE: 110 MMHG | OXYGEN SATURATION: 95 % | HEART RATE: 83 BPM

## 2025-01-14 DIAGNOSIS — R42 DIZZINESS: ICD-10-CM

## 2025-01-14 DIAGNOSIS — R10.9 ABDOMINAL CRAMPING: ICD-10-CM

## 2025-01-14 DIAGNOSIS — R82.90 ABNORMAL URINALYSIS: ICD-10-CM

## 2025-01-14 DIAGNOSIS — R21 RASH AND NONSPECIFIC SKIN ERUPTION: Primary | ICD-10-CM

## 2025-01-14 LAB
ALBUMIN/GLOBULIN RATIO: 1.8 G/DL
ALBUMIN: 4.5 G/DL (ref 3.5–5)
ALP BLD-CCNC: 61 UNITS/L (ref 38–126)
ALT SERPL-CCNC: 20 UNITS/L (ref 4–35)
ANION GAP SERPL CALCULATED.3IONS-SCNC: 5.1 MMOL/L (ref 3–11)
APPEARANCE FLUID: NORMAL
AST SERPL-CCNC: 34 UNITS/L (ref 14–36)
BASOPHILS ABSOLUTE: 0.09 X10E3/?L (ref 0–0.3)
BASOPHILS RELATIVE PERCENT: 1.24 % (ref 0–3)
BILIRUB SERPL-MCNC: 0.4 MG/DL (ref 0.2–1.3)
BILIRUBIN, POC: NEGATIVE
BLOOD URINE, POC: NORMAL
BUN BLDV-MCNC: 25 MG/DL (ref 7–17)
CALCIUM SERPL-MCNC: 9.4 MG/DL (ref 8.4–10.2)
CHLORIDE BLD-SCNC: 100 MMOL/L (ref 98–120)
CLARITY, POC: CLEAR
CO2: 30 MMOL/L (ref 22–31)
COLOR, POC: YELLOW
CREAT SERPL-MCNC: 0.8 MG/DL (ref 0.5–1)
EOSINOPHILS ABSOLUTE: 0.48 X10E3/?L (ref 0–1.1)
EOSINOPHILS RELATIVE PERCENT: 6.37 % (ref 0–10)
GFR, ESTIMATED: > 60
GLOBULIN: 2.5 G/DL
GLUCOSE URINE, POC: NEGATIVE MG/DL
GLUCOSE: 69 MG/DL (ref 65–105)
HCT VFR BLD CALC: 40.1 % (ref 37–47)
HEMOGLOBIN: 12.7 G/DL (ref 12–16)
KETONES, POC: NEGATIVE MG/DL
LEUKOCYTE EST, POC: NEGATIVE
LYMPHOCYTES ABSOLUTE: 2.56 X10E3/?L (ref 1–5.5)
LYMPHOCYTES RELATIVE PERCENT: 34.4 % (ref 20–51.1)
MCH RBC QN AUTO: 29.3 PG (ref 28.5–32.5)
MCHC RBC AUTO-ENTMCNC: 31.6 G/DL (ref 32–37)
MCV RBC AUTO: 92.9 FL (ref 80–94)
MONOCYTES ABSOLUTE: 0.53 X10E3/?L (ref 0.1–1)
MONOCYTES RELATIVE PERCENT: 7.04 % (ref 1.7–9.3)
NEUTROPHILS ABSOLUTE: 3.8 X10E3/?L (ref 2–8.1)
NEUTROPHILS RELATIVE PERCENT: 50.95 % (ref 42.2–75.2)
NITRITE, POC: NEGATIVE
PDW BLD-RTO: 11.4 % (ref 8.5–15.5)
PH, POC: 6
PLATELET # BLD: 307 THOU/MM3 (ref 130–400)
POTASSIUM SERPL-SCNC: 4 MMOL/L (ref 3.6–5)
PROTEIN, POC: NEGATIVE MG/DL
RBC # BLD: 4.31 M/UL (ref 4.2–5.4)
SODIUM BLD-SCNC: 135 MMOL/L (ref 135–145)
SPECIFIC GRAVITY, POC: 1.01
TOTAL PROTEIN: 6.9 G/DL (ref 6.3–8.2)
TSH REFLEX FT4: 4.57 MIU/ML (ref 0.49–4.67)
UROBILINOGEN, POC: 0.2 MG/DL
WBC # BLD: 7.5 THOU/ML3 (ref 4.8–10.8)

## 2025-01-14 RX ORDER — TRIAMCINOLONE ACETONIDE 1 MG/G
CREAM TOPICAL 2 TIMES DAILY
Qty: 80 G | Refills: 1 | Status: SHIPPED | OUTPATIENT
Start: 2025-01-14

## 2025-01-14 ASSESSMENT — PATIENT HEALTH QUESTIONNAIRE - PHQ9
SUM OF ALL RESPONSES TO PHQ QUESTIONS 1-9: 0
SUM OF ALL RESPONSES TO PHQ9 QUESTIONS 1 & 2: 0
SUM OF ALL RESPONSES TO PHQ QUESTIONS 1-9: 0
1. LITTLE INTEREST OR PLEASURE IN DOING THINGS: NOT AT ALL
SUM OF ALL RESPONSES TO PHQ QUESTIONS 1-9: 0
2. FEELING DOWN, DEPRESSED OR HOPELESS: NOT AT ALL
SUM OF ALL RESPONSES TO PHQ QUESTIONS 1-9: 0

## 2025-01-14 NOTE — PROGRESS NOTES
[place] [id number] [facility], [address]  [place], [place] [address]  Dept: [phone]  Dept Fax: [phone]    Date of Service:  1/14/2025    Beata Rashid is a 64 y.o. female who presents in office today with Self.    Chief Complaint   Patient presents with    Dizziness     For past two weeks has felt off balance, and when bends over and tries to get up quickly gets off balance    Vaginitis     States has had recurring yeast infections since was on abx    Rash     C/o on abdomen and back         Diagnoses / Plan:   1. Rash and nonspecific skin eruption  -     triamcinolone (KENALOG) 0.1 % cream; Apply topically 2 times daily, Topical, 2 TIMES DAILY Starting Tue 1/14/2025, Disp-80 g, R-1, Normal  2. Abdominal cramping  -     POCT Urinalysis no Micro  -     Comprehensive Metabolic Panel  -     CBC with Auto Differential  -     TSH reflex to FT4,FT3  3. Dizziness  -     POCT Urinalysis no Micro  -     Comprehensive Metabolic Panel  -     CBC with Auto Differential  -     TSH reflex to FT4,FT3  4. Abnormal urinalysis  -     POCT Urinalysis no Micro  -     Culture, Urine     Assessment & Plan  1. Dizziness - Likely orthostatic hypotension, possibly due to medications or supplements  - Discontinue supplements for a week to observe changes  - Monitor BP at home, especially during dizziness, and record readings  - Order CBC, thyroid function test, and electrolyte panel to rule out anemia and other causes  - Contact clinic if symptoms worsen    2. Abdominal rash   - Prescribe topical cream for twice-daily application until resolved  - Advise changing clothes immediately after workouts    3. Vaginal itching - Last Pap smear in 2019  - Schedule Pap smear to assess for menopausal changes  - Use prescribed vaginal cream as needed    Follow-up  - Schedule follow-up appointment to reassess dizziness and review blood pressure readings  - Schedule Pap smear     [facility]

## 2025-02-03 ENCOUNTER — OFFICE VISIT (OUTPATIENT)
Dept: FAMILY MEDICINE CLINIC | Age: 65
End: 2025-02-03
Payer: COMMERCIAL

## 2025-02-03 VITALS
DIASTOLIC BLOOD PRESSURE: 82 MMHG | BODY MASS INDEX: 26.7 KG/M2 | WEIGHT: 146 LBS | HEART RATE: 81 BPM | OXYGEN SATURATION: 97 % | SYSTOLIC BLOOD PRESSURE: 120 MMHG

## 2025-02-03 DIAGNOSIS — R42 DIZZINESS: ICD-10-CM

## 2025-02-03 DIAGNOSIS — S46.912A SHOULDER STRAIN, LEFT, INITIAL ENCOUNTER: ICD-10-CM

## 2025-02-03 DIAGNOSIS — Z01.419 ENCOUNTER FOR WELL WOMAN EXAM WITH ROUTINE GYNECOLOGICAL EXAM: Primary | ICD-10-CM

## 2025-02-03 PROCEDURE — 99396 PREV VISIT EST AGE 40-64: CPT | Performed by: NURSE PRACTITIONER

## 2025-02-03 RX ORDER — MELOXICAM 15 MG/1
15 TABLET ORAL DAILY
Qty: 90 TABLET | Refills: 1 | Status: SHIPPED | OUTPATIENT
Start: 2025-02-03

## 2025-02-03 SDOH — ECONOMIC STABILITY: FOOD INSECURITY: WITHIN THE PAST 12 MONTHS, THE FOOD YOU BOUGHT JUST DIDN'T LAST AND YOU DIDN'T HAVE MONEY TO GET MORE.: NEVER TRUE

## 2025-02-03 SDOH — ECONOMIC STABILITY: FOOD INSECURITY: WITHIN THE PAST 12 MONTHS, YOU WORRIED THAT YOUR FOOD WOULD RUN OUT BEFORE YOU GOT MONEY TO BUY MORE.: NEVER TRUE

## 2025-02-03 ASSESSMENT — ENCOUNTER SYMPTOMS
BLOOD IN STOOL: 0
ABDOMINAL PAIN: 0
WHEEZING: 0
DIARRHEA: 0
COUGH: 0
SHORTNESS OF BREATH: 0
CONSTIPATION: 0

## 2025-02-03 NOTE — PROGRESS NOTES
[address], [address]  [place], [place] [address]  Dept: [phone]  Dept Fax: [phone]    Date of Service:  2/3/2025    Beata Rashid is a 64 y.o. female who comes in today for follow up of chronic health issues.     Chief Complaint   Patient presents with    Gynecologic Exam    Follow-up     BP f/u        Diagnoses / Plan:     1. Encounter for well woman exam with routine gynecological exam  -     PAP SMEAR; Future  2. Shoulder strain, left, initial encounter  -     meloxicam (MOBIC) 15 MG tablet; Take 1 tablet by mouth daily, Disp-90 tablet, R-1Normal  3. Dizziness  Assessment & Plan:  Improving     Assessment & Plan  1. Dizziness: Likely postural hypotension, exacerbated by warm showers.    2. Weight management: BMI 26, advised to continue high-protein diet, exercise, multivitamin, and hydration.    3. Foot pain: Likely arthritis from previous injury. Advised to apply Voltaren gel. Consider x-ray if symptoms persist.    4. Shoulder discomfort: Resume dry needling and massage therapy.    Health maintenance: Up to date on mammogram (October). Colonoscopy and upper scope done. Performs self-breast exams. No discharge except with cream. Pap smear performed; one more needed at 66 if current results normal. Continue regular mammograms.     PROCEDURE  Colonoscopy and upper endoscopy were performed previously.     Pap smear with HPV cotesting completed.  Will notify patient of results once reviewed.  Encouraged monthly self breast exams, healthy diet and routine exercise. Patient verbalizes understanding regarding plan of care and all questions answered.    Return in about 6 months (around 8/3/2025).     Subjective:   History of Present Illness:  Patient presents for routine gynecological exam.  She denies having any acute gynecological related issues except mild itching.  No vaginal discharge. No abdominal pain, or urinary complaints.      History of Present

## 2025-02-09 LAB — GYNECOLOGY CYTOLOGY REPORT: NORMAL

## 2025-03-17 ENCOUNTER — OFFICE VISIT (OUTPATIENT)
Dept: FAMILY MEDICINE CLINIC | Age: 65
End: 2025-03-17
Payer: COMMERCIAL

## 2025-03-17 VITALS
BODY MASS INDEX: 27.14 KG/M2 | DIASTOLIC BLOOD PRESSURE: 74 MMHG | OXYGEN SATURATION: 96 % | SYSTOLIC BLOOD PRESSURE: 114 MMHG | HEART RATE: 86 BPM | WEIGHT: 148.4 LBS

## 2025-03-17 DIAGNOSIS — S46.911A RIGHT SHOULDER STRAIN, INITIAL ENCOUNTER: Primary | ICD-10-CM

## 2025-03-17 PROCEDURE — 99213 OFFICE O/P EST LOW 20 MIN: CPT | Performed by: NURSE PRACTITIONER

## 2025-03-17 RX ORDER — DICLOFENAC SODIUM 75 MG/1
75 TABLET, DELAYED RELEASE ORAL 2 TIMES DAILY
Qty: 28 TABLET | Refills: 0 | Status: SHIPPED | OUTPATIENT
Start: 2025-03-17 | End: 2025-03-31

## 2025-03-17 NOTE — PROGRESS NOTES
[address], [address]  [place] [id number]  Dept: [phone]  Dept Fax: [phone]    Date of Service:  3/17/2025    Beata Rashid is a 64 y.o. female who presents today for her medical conditions/complaints as noted below.      Chief Complaint   Patient presents with    Arm Pain     Left arm \"popped\" while lifting weight 2 weeks ago. Has had rotator cuf surgery in that arm in the past      DIAGNOSIS / PLAN:     Assessment & Plan  1. Rotator cuff injury: Acute.  - Discontinue meloxicam temporarily.  - Start diclofenac 75 mg twice daily for 2 weeks.  - Apply ice for 20 minutes at a time, off for 1 hour.   - Perform simple range of motion exercises without weights.  - Treadmill, biking, and swimming are allowed as long as they do not cause pain.    Follow-up  - Contact the office if there is no improvement.     Discussed the purpose, benefits, and potential side effects of the prescribed medications in detail. Addressed all of the patient's questions, and the patient expressed clear understanding. The patient agreed with the proposed treatment plan. Follow-up scheduled as directed.    Return if symptoms worsen or fail to improve.    SUBJECTIVE:     History of Present Illness  The patient presents for evaluation of arm pain.    They report an injury to their arm sustained during a bench press exercise, which is part of their regular strength training regimen. They describe a popping sensation in the arm, followed by pain. Despite taking a week off from their routine due to a vacation, the pain persists, particularly when performing certain movements such as lifting a can. They also experience weakness in the arm, especially when lying down on the couch. An incident involving their grandchildren jumping on them exacerbated the pain. They have been unable to perform exercises involving weights and have resorted to using an elastic band for resistance training.

## 2025-03-21 ENCOUNTER — OFFICE VISIT (OUTPATIENT)
Dept: FAMILY MEDICINE CLINIC | Age: 65
End: 2025-03-21
Payer: COMMERCIAL

## 2025-03-21 VITALS
BODY MASS INDEX: 27.8 KG/M2 | SYSTOLIC BLOOD PRESSURE: 130 MMHG | HEART RATE: 76 BPM | WEIGHT: 152 LBS | OXYGEN SATURATION: 98 % | DIASTOLIC BLOOD PRESSURE: 78 MMHG

## 2025-03-21 DIAGNOSIS — H66.92 LEFT OTITIS MEDIA, UNSPECIFIED OTITIS MEDIA TYPE: ICD-10-CM

## 2025-03-21 DIAGNOSIS — J30.2 SEASONAL ALLERGIC RHINITIS, UNSPECIFIED TRIGGER: Primary | ICD-10-CM

## 2025-03-21 PROCEDURE — 99213 OFFICE O/P EST LOW 20 MIN: CPT | Performed by: STUDENT IN AN ORGANIZED HEALTH CARE EDUCATION/TRAINING PROGRAM

## 2025-03-21 NOTE — PROGRESS NOTES
[address], [address]  [place] [address]  Phone: [phone]  Fax: [phone]      Date of Visit:  3/21/25  Patient Name: Beata Rashid   Patient :  1960     ASSESSMENT/PLAN     1. Seasonal allergic rhinitis, unspecified trigger  2. Left otitis media, unspecified otitis media type  -     amoxicillin-clavulanate (AUGMENTIN) 875-125 MG per tablet; Take 1 tablet by mouth 2 times daily for 7 days, Disp-14 tablet, R-0Normal    Will treat left otitis media with Augmentin.  Patient to continue daily nasal sinus rinses, antihistamine, Flonase.  Return precautions given.    - Questions/concerns answered. Patient verbalized and expressed understanding. Medications, laboratory testing, imaging, consultation, and follow up as documented in this encounter.       HPI:     Beata Rashid is a 64 y.o. female with   Patient Active Problem List   Diagnosis    Dizziness    Primary osteoarthritis of left shoulder    Arthritis of both wrists    Atrophic vaginitis    Gastroesophageal reflux disease    Seasonal allergic rhinitis    Lipoma of right upper extremity    Cyst, breast, sebaceous, right     who presents today to discuss   Chief Complaint   Patient presents with    Ear Pain     C/o left ear feeling plugged and ear pain. States just returned home from trip and reports did not have ear pop during flight       HPI: Patient presents today complaining of left ear pain.  Feels it is plugged.  Is noticed over the last several days.  No fevers or chills.  However did have some throat congestion and other URI symptoms recently as well.      Patient denies any fevers, chills, headaches, visual changes, lightheadedness, dizziness, chest pain, palpitations, shortness of breath, abdominal pain, nausea, vomiting, dysuria, hematuria, issues with bowel habits, numbness, tingling, issues with gait or ambulation.    REVIEW OF SYSTEM      As in HPI    REVIEWED

## 2025-03-21 NOTE — PATIENT INSTRUCTIONS
Make sure you're using Flonase (fluticasone) - 2 sprays each nostril daily  Continue the antihistamine and NetiPot rinses

## 2025-05-12 ENCOUNTER — OFFICE VISIT (OUTPATIENT)
Dept: FAMILY MEDICINE CLINIC | Age: 65
End: 2025-05-12
Payer: COMMERCIAL

## 2025-05-12 VITALS
HEART RATE: 67 BPM | BODY MASS INDEX: 26.78 KG/M2 | DIASTOLIC BLOOD PRESSURE: 72 MMHG | OXYGEN SATURATION: 98 % | WEIGHT: 146.4 LBS | SYSTOLIC BLOOD PRESSURE: 120 MMHG

## 2025-05-12 DIAGNOSIS — D32.9 MENINGIOMA (HCC): ICD-10-CM

## 2025-05-12 DIAGNOSIS — M54.81 OCCIPITAL NEURALGIA OF LEFT SIDE: Primary | ICD-10-CM

## 2025-05-12 PROCEDURE — 99213 OFFICE O/P EST LOW 20 MIN: CPT | Performed by: NURSE PRACTITIONER

## 2025-05-12 RX ORDER — GABAPENTIN 100 MG/1
200 CAPSULE ORAL 2 TIMES DAILY
Qty: 60 CAPSULE | Refills: 0 | Status: SHIPPED | OUTPATIENT
Start: 2025-05-12 | End: 2025-05-19

## 2025-05-12 NOTE — PROGRESS NOTES
[place] [id number] [facility], [address]  [place] [address]  Dept: [phone]  Dept Fax: [phone]    Date of Service:  5/12/2025    Beata Rashid is a 64 y.o. female who presents today for her medical conditions/complaints as noted below.      Chief Complaint   Patient presents with    Headache     Has pain on left side temple area, can feel it starting from her neck, has taken OTC but on Sunday night was the worst with pain going into ear. Sometimes pain goes into jaw. Pain always stays on left side. Usually gets at night and wakes up and its gone. Denies nausea when head aches, denied SOB.       DIAGNOSIS / PLAN:     1. Occipital neuralgia of left side  Assessment & Plan:  - Gabapentin 200 mg, to be taken as two capsules twice daily for 7 days. After this period, take as needed for pain. If symptoms worsen, increase dosage to three capsules three times daily.  - Contact neurosurgeon regarding MRI order and inform them about the current treatment plan with gabapentin.  Orders:  -     gabapentin (NEURONTIN) 100 MG capsule; Take 2 capsules by mouth 2 times daily for 7 days. Then may take as needed for pain., Disp-60 capsule, R-0Normal  2. Meningioma (HCC)  Assessment & Plan:   Monitored by specialist- no acute findings meriting change in the plan     Discussed the purpose, benefits, and potential side effects of the prescribed medications in detail. Addressed all of the patient's questions, and the patient expressed clear understanding. The patient agreed with the proposed treatment plan. Follow-up scheduled as directed.    Return if symptoms worsen or fail to improve.    SUBJECTIVE:     History of Present Illness  The patient presents for evaluation of pain to the back of neck, left jaw, ear and temporal area.     They report experiencing positional headaches, particularly noticeable when lying on the couch or in a supine position. These headaches have been increasing

## 2025-05-12 NOTE — ASSESSMENT & PLAN NOTE
- Gabapentin 200 mg, to be taken as two capsules twice daily for 7 days. After this period, take as needed for pain. If symptoms worsen, increase dosage to three capsules three times daily.  - Contact neurosurgeon regarding MRI order and inform them about the current treatment plan with gabapentin.

## 2025-06-02 ENCOUNTER — PATIENT MESSAGE (OUTPATIENT)
Dept: FAMILY MEDICINE CLINIC | Age: 65
End: 2025-06-02

## 2025-06-02 DIAGNOSIS — S46.912A SHOULDER STRAIN, LEFT, INITIAL ENCOUNTER: ICD-10-CM

## 2025-06-02 RX ORDER — MELOXICAM 15 MG/1
15 TABLET ORAL DAILY
Qty: 90 TABLET | Refills: 1 | Status: SHIPPED | OUTPATIENT
Start: 2025-06-02